# Patient Record
Sex: FEMALE | Race: WHITE | NOT HISPANIC OR LATINO | URBAN - METROPOLITAN AREA
[De-identification: names, ages, dates, MRNs, and addresses within clinical notes are randomized per-mention and may not be internally consistent; named-entity substitution may affect disease eponyms.]

---

## 2017-06-26 ENCOUNTER — APPOINTMENT (OUTPATIENT)
Dept: URBAN - METROPOLITAN AREA CLINIC 200 | Age: 72
Setting detail: DERMATOLOGY
End: 2017-06-28

## 2017-06-26 ENCOUNTER — RX ONLY (RX ONLY)
Age: 72
End: 2017-06-26

## 2017-06-26 DIAGNOSIS — L57.8 OTHER SKIN CHANGES DUE TO CHRONIC EXPOSURE TO NONIONIZING RADIATION: ICD-10-CM

## 2017-06-26 DIAGNOSIS — L30.8 OTHER SPECIFIED DERMATITIS: ICD-10-CM

## 2017-06-26 DIAGNOSIS — D22 MELANOCYTIC NEVI: ICD-10-CM

## 2017-06-26 DIAGNOSIS — D485 NEOPLASM OF UNCERTAIN BEHAVIOR OF SKIN: ICD-10-CM

## 2017-06-26 PROBLEM — D48.5 NEOPLASM OF UNCERTAIN BEHAVIOR OF SKIN: Status: ACTIVE | Noted: 2017-06-26

## 2017-06-26 PROBLEM — D22.5 MELANOCYTIC NEVI OF TRUNK: Status: ACTIVE | Noted: 2017-06-26

## 2017-06-26 PROCEDURE — 99213 OFFICE O/P EST LOW 20 MIN: CPT | Mod: 25

## 2017-06-26 PROCEDURE — OTHER BIOPSY BY SHAVE METHOD: OTHER

## 2017-06-26 PROCEDURE — 11100: CPT

## 2017-06-26 PROCEDURE — OTHER PRESCRIPTION: OTHER

## 2017-06-26 PROCEDURE — OTHER COUNSELING: OTHER

## 2017-06-26 RX ORDER — DOXEPIN HYDROCHLORIDE 50 MG/G
CREAM TOPICAL
Qty: 1 | Refills: 6 | Status: ERX

## 2017-06-26 ASSESSMENT — LOCATION DETAILED DESCRIPTION DERM
LOCATION DETAILED: UPPER STERNUM
LOCATION DETAILED: LEFT CENTRAL BUCCAL CHEEK
LOCATION DETAILED: LEFT ANTERIOR PROXIMAL THIGH
LOCATION DETAILED: LEFT INGUINAL CREASE

## 2017-06-26 ASSESSMENT — LOCATION SIMPLE DESCRIPTION DERM
LOCATION SIMPLE: LEFT THIGH
LOCATION SIMPLE: CHEST
LOCATION SIMPLE: LEFT CHEEK
LOCATION SIMPLE: GROIN

## 2017-06-26 ASSESSMENT — LOCATION ZONE DERM
LOCATION ZONE: TRUNK
LOCATION ZONE: LEG
LOCATION ZONE: FACE

## 2017-06-26 NOTE — PROCEDURE: BIOPSY BY SHAVE METHOD
Dressing: bandage
X Size Of Lesion In Cm: 0
Size Of Lesion In Cm: 0.2
Detail Level: Detailed
Wound Care: Vaseline
Billing Type: Third-Party Bill
Post-Care Instructions: I reviewed with the patient in detail post-care instructions. Patient is to keep the biopsy site dry overnight, and then apply bacitracin twice daily until healed. Patient may apply hydrogen peroxide soaks to remove any crusting.
Notification Instructions: Patient will be notified of biopsy results. However, patient instructed to call the office if not contacted within 2 weeks.
Biopsy Type: H and E
Type Of Destruction Used: Curettage
Bill 66860 For Specimen Handling/Conveyance To Laboratory?: no
Anesthesia Volume In Cc (Will Not Render If 0): 0.1
Hemostasis: Drysol
Anesthesia Type: 1% lidocaine with epinephrine
consent was obtained and risks were reviewed including but not limited to scarring, infection, bleeding, scabbing, incomplete removal, nerve damage and allergy to anesthesia.
Biopsy Method: Personna blade

## 2018-07-05 ENCOUNTER — APPOINTMENT (OUTPATIENT)
Dept: URBAN - METROPOLITAN AREA CLINIC 200 | Age: 73
Setting detail: DERMATOLOGY
End: 2018-07-05

## 2018-07-05 DIAGNOSIS — L57.0 ACTINIC KERATOSIS: ICD-10-CM

## 2018-07-05 DIAGNOSIS — L57.8 OTHER SKIN CHANGES DUE TO CHRONIC EXPOSURE TO NONIONIZING RADIATION: ICD-10-CM

## 2018-07-05 DIAGNOSIS — Z85.828 PERSONAL HISTORY OF OTHER MALIGNANT NEOPLASM OF SKIN: ICD-10-CM

## 2018-07-05 PROCEDURE — 17000 DESTRUCT PREMALG LESION: CPT

## 2018-07-05 PROCEDURE — 99213 OFFICE O/P EST LOW 20 MIN: CPT | Mod: 25

## 2018-07-05 PROCEDURE — OTHER LIQUID NITROGEN: OTHER

## 2018-07-05 PROCEDURE — OTHER COUNSELING: OTHER

## 2018-07-05 ASSESSMENT — LOCATION SIMPLE DESCRIPTION DERM
LOCATION SIMPLE: CHEST
LOCATION SIMPLE: LEFT HAND
LOCATION SIMPLE: LEFT ANTERIOR NECK

## 2018-07-05 ASSESSMENT — LOCATION ZONE DERM
LOCATION ZONE: NECK
LOCATION ZONE: HAND
LOCATION ZONE: TRUNK

## 2018-07-05 ASSESSMENT — LOCATION DETAILED DESCRIPTION DERM
LOCATION DETAILED: LEFT MEDIAL SUPERIOR CHEST
LOCATION DETAILED: LEFT INFERIOR ANTERIOR NECK
LOCATION DETAILED: LEFT ULNAR DORSAL HAND

## 2018-07-05 ASSESSMENT — PAIN INTENSITY VAS: HOW INTENSE IS YOUR PAIN 0 BEING NO PAIN, 10 BEING THE MOST SEVERE PAIN POSSIBLE?: NO PAIN

## 2019-08-09 ENCOUNTER — APPOINTMENT (OUTPATIENT)
Dept: URBAN - METROPOLITAN AREA CLINIC 200 | Age: 74
Setting detail: DERMATOLOGY
End: 2019-08-22

## 2019-08-09 DIAGNOSIS — L57.8 OTHER SKIN CHANGES DUE TO CHRONIC EXPOSURE TO NONIONIZING RADIATION: ICD-10-CM

## 2019-08-09 DIAGNOSIS — Z85.828 PERSONAL HISTORY OF OTHER MALIGNANT NEOPLASM OF SKIN: ICD-10-CM

## 2019-08-09 DIAGNOSIS — L72.0 EPIDERMAL CYST: ICD-10-CM

## 2019-08-09 PROCEDURE — OTHER REASSURANCE: OTHER

## 2019-08-09 PROCEDURE — 99213 OFFICE O/P EST LOW 20 MIN: CPT

## 2019-08-09 PROCEDURE — OTHER COUNSELING: OTHER

## 2019-08-09 ASSESSMENT — LOCATION DETAILED DESCRIPTION DERM
LOCATION DETAILED: LEFT INFERIOR NASAL CHEEK
LOCATION DETAILED: LEFT MEDIAL SUPERIOR CHEST
LOCATION DETAILED: LEFT INFERIOR ANTERIOR NECK

## 2019-08-09 ASSESSMENT — LOCATION ZONE DERM
LOCATION ZONE: NECK
LOCATION ZONE: TRUNK
LOCATION ZONE: FACE

## 2019-08-09 ASSESSMENT — LOCATION SIMPLE DESCRIPTION DERM
LOCATION SIMPLE: LEFT CHEEK
LOCATION SIMPLE: CHEST
LOCATION SIMPLE: LEFT ANTERIOR NECK

## 2020-06-29 RX ORDER — CYANOCOBALAMIN (VITAMIN B-12) 500 MCG
400 TABLET ORAL EVERY MORNING
COMMUNITY

## 2020-06-29 RX ORDER — SIMVASTATIN 20 MG/1
20 TABLET, FILM COATED ORAL NIGHTLY
COMMUNITY

## 2020-06-29 RX ORDER — HYDROCHLOROTHIAZIDE 25 MG/1
25 TABLET ORAL EVERY MORNING
COMMUNITY

## 2020-06-29 RX ORDER — DILTIAZEM HYDROCHLORIDE 240 MG/1
240 CAPSULE, COATED, EXTENDED RELEASE ORAL EVERY MORNING
COMMUNITY

## 2020-06-29 RX ORDER — LEVOMEFOLATE CALCIUM 15 MG
15 TABLET ORAL NIGHTLY
COMMUNITY

## 2020-06-29 RX ORDER — VENLAFAXINE HYDROCHLORIDE 225 MG/1
TABLET, EXTENDED RELEASE ORAL EVERY MORNING
COMMUNITY

## 2020-06-29 RX ORDER — LOSARTAN POTASSIUM 100 MG/1
100 TABLET ORAL EVERY MORNING
COMMUNITY

## 2020-06-29 SDOH — HEALTH STABILITY: MENTAL HEALTH: HOW OFTEN DO YOU HAVE A DRINK CONTAINING ALCOHOL?: 4 OR MORE TIMES A WEEK

## 2020-06-29 SDOH — HEALTH STABILITY: MENTAL HEALTH: HOW OFTEN DO YOU HAVE SIX OR MORE DRINKS ON ONE OCCASION?: WEEKLY

## 2020-06-29 SDOH — HEALTH STABILITY: MENTAL HEALTH: HOW MANY DRINKS CONTAINING ALCOHOL DO YOU HAVE ON A TYPICAL DAY WHEN YOU ARE DRINKING?: 1 OR 2

## 2020-06-29 NOTE — PRE-PROCEDURE INSTRUCTIONS
1. Admissions will call you with your arrival time on 7/8/2020 (day prior to surgery) between 2pm - 4pm. For questions about your arrival time, please call [phone].    2. Please report to the [facility] in the [place] on the day of your procedure. Enter the hospital through the [place] [facility] (main entrance at [address], [place]). If you are parking in the [facility], come to the ground floor of the garage and follow signs to the [facility]. Bring your insurance card and photo ID.     3. Please follow these fasting guidelines:  - STOP all solid food 8 hours prior to arrival.   - No more than 12oz of water is permitted and must STOP 2 HOURS prior to arrival to the hospital.     4. Early on the morning of the procedure, please take the following medications listed below with a sip of water, in addition to any medications prescribed by your surgeon: Diltiazem, Venlafaxine    *NO aspirin, ibuprofen, anti-inflammatories, fish oil or Vitamin E unless ordered by physician.    *Stop taking      5. Other instructions: Hibiclens shower x 2(no face or genital area), brush teeth    6. If you develop a cough, cold, fever, or other symptom prior to the date of the procedure, please report it to your physician immediately.    7. If you need to cancel the procedure for any reason, please contact your physician.    8. Make arrangements to have someone drive you home from the procedure. If you have not arranged for transportation home, your surgery may be cancelled.     9. You may not take public transportation or or a cab unless accompanied by a responsible person.    10. You may not drive a car or operate complex or potentially dangerous machinery for 24 hours following anesthesia and/or sedation.    11. If it is medically necessary for you to have a longer stay, you will be informed as soon as the decision is made.    12. Do not wear or bring anything of value to the hospital,  including medication or jewelry of any kind. Do not wear make-up or contact lenses. Do bring your glasses and hearing aid, with a case. If you use a CPAP machine and will be overnight, please bring it with you. If you use any inhalers, please bring them as well.     13. Dress in comfortable clothes.    14. If instructed, please bring a copy of your Advance Directive (Living Will/Durable Power of ) on the day of your procedure.    Pre operative instructions given as per protocol.  Form explained by:

## 2020-07-01 ENCOUNTER — TRANSCRIBE ORDERS (OUTPATIENT)
Dept: LAB | Facility: HOSPITAL | Age: 75
End: 2020-07-01

## 2020-07-01 ENCOUNTER — HOSPITAL ENCOUNTER (OUTPATIENT)
Dept: CARDIOLOGY | Facility: HOSPITAL | Age: 75
Discharge: HOME | End: 2020-07-01
Attending: ORTHOPAEDIC SURGERY
Payer: MEDICARE

## 2020-07-01 ENCOUNTER — APPOINTMENT (OUTPATIENT)
Dept: PREADMISSION TESTING | Facility: HOSPITAL | Age: 75
End: 2020-07-01
Attending: ORTHOPAEDIC SURGERY
Payer: MEDICARE

## 2020-07-01 ENCOUNTER — APPOINTMENT (OUTPATIENT)
Dept: LAB | Facility: HOSPITAL | Age: 75
End: 2020-07-01
Attending: ORTHOPAEDIC SURGERY
Payer: MEDICARE

## 2020-07-01 VITALS
RESPIRATION RATE: 18 BRPM | SYSTOLIC BLOOD PRESSURE: 172 MMHG | TEMPERATURE: 97.3 F | HEART RATE: 96 BPM | WEIGHT: 185.8 LBS | OXYGEN SATURATION: 97 % | DIASTOLIC BLOOD PRESSURE: 79 MMHG | HEIGHT: 65 IN | BODY MASS INDEX: 30.96 KG/M2

## 2020-07-01 DIAGNOSIS — M17.12 UNILATERAL PRIMARY OSTEOARTHRITIS, LEFT KNEE: ICD-10-CM

## 2020-07-01 DIAGNOSIS — M17.12 UNILATERAL PRIMARY OSTEOARTHRITIS, LEFT KNEE: Primary | ICD-10-CM

## 2020-07-01 DIAGNOSIS — E78.49 OTHER HYPERLIPIDEMIA: ICD-10-CM

## 2020-07-01 DIAGNOSIS — Z01.818 PREOP EXAMINATION: Primary | ICD-10-CM

## 2020-07-01 DIAGNOSIS — I10 ESSENTIAL HYPERTENSION: ICD-10-CM

## 2020-07-01 PROBLEM — E78.5 HYPERLIPIDEMIA: Status: ACTIVE | Noted: 2020-07-01

## 2020-07-01 LAB
ANION GAP SERPL CALC-SCNC: 11 MEQ/L (ref 3–15)
BUN SERPL-MCNC: 13 MG/DL (ref 8–20)
CALCIUM SERPL-MCNC: 8.9 MG/DL (ref 8.9–10.3)
CHLORIDE SERPL-SCNC: 99 MEQ/L (ref 98–109)
CO2 SERPL-SCNC: 26 MEQ/L (ref 22–32)
CREAT SERPL-MCNC: 0.6 MG/DL (ref 0.6–1.1)
ERYTHROCYTE [DISTWIDTH] IN BLOOD BY AUTOMATED COUNT: 13.2 % (ref 11.7–14.4)
GFR SERPL CREATININE-BSD FRML MDRD: >60 ML/MIN/1.73M*2
GLUCOSE SERPL-MCNC: 94 MG/DL (ref 70–99)
HCT VFR BLDCO AUTO: 41.4 % (ref 35–45)
HGB BLD-MCNC: 14 G/DL (ref 11.8–15.7)
MCH RBC QN AUTO: 31 PG (ref 28–33.2)
MCHC RBC AUTO-ENTMCNC: 33.8 G/DL (ref 32.2–35.5)
MCV RBC AUTO: 91.6 FL (ref 83–98)
PDW BLD AUTO: 9.3 FL (ref 9.4–12.3)
PLATELET # BLD AUTO: 247 K/UL (ref 150–369)
POTASSIUM SERPL-SCNC: 3.8 MEQ/L (ref 3.6–5.1)
RBC # BLD AUTO: 4.52 M/UL (ref 3.93–5.22)
SODIUM SERPL-SCNC: 136 MEQ/L (ref 136–144)
WBC # BLD AUTO: 9.15 K/UL (ref 3.8–10.5)

## 2020-07-01 PROCEDURE — 80048 BASIC METABOLIC PNL TOTAL CA: CPT

## 2020-07-01 PROCEDURE — 85027 COMPLETE CBC AUTOMATED: CPT | Mod: GZ

## 2020-07-01 PROCEDURE — 93005 ELECTROCARDIOGRAM TRACING: CPT

## 2020-07-01 PROCEDURE — 99215 OFFICE O/P EST HI 40 MIN: CPT | Performed by: HOSPITALIST

## 2020-07-01 PROCEDURE — 36415 COLL VENOUS BLD VENIPUNCTURE: CPT | Mod: GA

## 2020-07-01 ASSESSMENT — PAIN SCALES - GENERAL: PAINLEVEL: 0-NO PAIN

## 2020-07-01 NOTE — CONSULTS
[facility] Medicine Service -  Pre-Operative Consultation       Patient Name: Anne-Marie Quiroga  Referring Surgeon: dr. keenan    Reason for Referral: Pre-Operative Evaluation  Surgical Procedure: Left Total Knee Replacement  Operative Date: 7/9/20  Other Providers:      PCP: Yariel Velasco MD        HISTORY OF PRESENT ILLNESS      Anne-Marie Quiroga is a 75 y.o. female presenting today to the [facility] at [facility] for pre-operative evaluation prior to planned surgery.  Pt has had knee pain chronically. Pt has tried conservative management such as injection therapy without success. Pt has now opted for surgical intervention.     The patient denies any current or recent chest pain or pressure, dyspnea, cough, sputum, fevers, chills, abdominal pain, nausea, vomiting, diarrhea, urinary complaints, dizzy, lightheaded, blood in stool or other symptoms.     Functionally, the patient is able to ascend a flight or so of stairs with no dyspnea or chest pain.     The patient denies, on specific questioning, the following:  No history of MI, arrhythmia,or CHF.  No history of FERNANDO.  No history of DVT/PE.  No history of COPD.  No history of CVA.  No history of DM.   No history of CKD.     PAST MEDICAL AND SURGICAL HISTORY      Past Medical History:   Diagnosis Date   • Anxiety    • Arthritis    • Basal cell carcinoma     left eye/left nasal   • Depression    • History of nuclear stress test 03/06/2020    sent by PCP prior to L TKR   • Hypertension    • Lipid disorder        Past Surgical History:   Procedure Laterality Date   • BASAL CELL CARCINOMA EXCISION      left eye area/left nasal   • CHOLECYSTECTOMY     • COLONOSCOPY W/ POLYPECTOMY     • HYSTERECTOMY         MEDICATIONS        Current Outpatient Medications:   •  cholecalciferol, vitamin D3, (cholecalciferol) 400 unit (10 mcg) tablet tablet, Take 400 Units by mouth every morning., Disp: , Rfl:   •  dilTIAZem CD (CARDIZEM CD) 240 mg 24  "hr capsule, Take 240 mg by mouth every morning., Disp: , Rfl:   •  hydrochlorothiazide (HYDRODIURIL) 25 mg tablet, Take 25 mg by mouth every morning., Disp: , Rfl:   •  levomefolate calcium (L-METHYLFOLATE) 15 mg tablet, Take 15 mg by mouth nightly., Disp: , Rfl:   •  losartan (COZAAR) 100 mg tablet, Take 100 mg by mouth every morning., Disp: , Rfl:   •  multivit-min/iron/folic/lutein (CENTRUM SILVER WOMEN ORAL), Take 1 tablet by mouth every morning., Disp: , Rfl:   •  simvastatin (ZOCOR) 20 mg tablet, Take 20 mg by mouth nightly., Disp: , Rfl:   •  venlafaxine 225 mg tablet extended release 24hr 24 hr tablet, Take by mouth every morning., Disp: , Rfl:     ALLERGIES      Sulfa (sulfonamide antibiotics)    FAMILY HISTORY      family history is not on file.    Denies any prior known family history of DVTs/PEs/clotting disorder    SOCIAL HISTORY      Social History     Tobacco Use   • Smoking status: Former Smoker     Packs/day: 1.00     Years: 40.00     Pack years: 40.00     Types: Cigarettes     Last attempt to quit: 2017     Years since quitting: 3.4   • Smokeless tobacco: Never Used   Substance Use Topics   • Alcohol use: Yes     Alcohol/week: 14.0 standard drinks     Types: 14 Glasses of wine per week     Frequency: 4 or more times a week     Drinks per session: 1 or 2     Binge frequency: Weekly   • Drug use: Never       REVIEW OF SYSTEMS      All other systems reviewed and negative except as noted in HPI    PHYSICAL EXAMINATION      Visit Vitals  BP (!) 172/79 (BP Location: Left upper arm, Patient Position: Sitting)   Pulse 96   Temp 36.3 °C (97.3 °F) (Temporal)   Resp 18   Ht 1.651 m (5' 5\")   Wt 84.3 kg (185 lb 12.8 oz)   SpO2 97% Comment: RA   Breastfeeding No   BMI 30.92 kg/m²     Body mass index is 30.92 kg/m².    Physical Exam   Constitutional: She is oriented to person, place, and time.   Eyes: EOM are normal.   Cardiovascular: Regular rhythm. Exam reveals no gallop and no friction rub.   Pulmonary/Chest: " Effort normal and breath sounds normal. No stridor. No respiratory distress. She has no wheezes. She has no rales.   Abdominal: Soft. Bowel sounds are normal.   Neurological: She is alert and oriented to person, place, and time.   Skin: Skin is warm. She is not diaphoretic.   Psychiatric: She has a normal mood and affect. Her behavior is normal. Judgment and thought content normal.       LABS / EKG        Labs  Results from last 7 days   Lab Units 07/01/20  1155   WBC K/uL 9.15   HEMOGLOBIN g/dL 14.0   HEMATOCRIT % 41.4   PLATELETS K/uL 247     Results from last 7 days   Lab Units 07/01/20  1155   SODIUM mEQ/L 136   POTASSIUM mEQ/L 3.8   CHLORIDE mEQ/L 99   CO2 mEQ/L 26   BUN mg/dL 13   CREATININE mg/dL 0.6   CALCIUM mg/dL 8.9   GLUCOSE mg/dL 94         EKG   EKG: independently reviewed- sr w hr of 85. No st elevations.     ASSESSMENT AND PLAN         Preop examination  Pt sched for Left Total Knee Replacement  Pt has no cardiac symptoms and ekg is nonischemic. Pt exhibits no signs/symptoms of angina, arrythmia or decompenstated CHF. Pt also had stress test done in 3/6/20 which was unremarkable per dr. Velasco  Pt's labs are at an acceptable level to proceed w surgery  Pt is an intermediate but acceptable risk for surgery.     HTN (hypertension)  Cont w ccb  Hold hctz/cozaar on the morning of surgery    Hyperlipidemia  Cont w statin.        In regards to perioperative cardiac risk:  The patient denies any history of ischemic heart disease, denies any history of CHF, denies any history of CVA, is not on pre-operative treatment with insulin, and does not have a pre-operative creatinine > 2 mg/dL.   Pt's revised cardiac risk index  (RCRI) is 0.4 % for rate of cardiac death, nonfatal myocardial infarction, and nonfatal cardiac arrest     Further comments:  Resume supplements when OK with surgical team.  I would encourage incentive spirometry to assist with minimizing emmy-operative pulmonary risk.  DVT prophylaxis and  timing of such per the discretion of the surgeon.     Please do not hesitate to contact [facility] during the upcoming hospitalization with any questions or concerns.     Niraj Taveras. DO [place]  7/1/2020

## 2020-07-01 NOTE — H&P (VIEW-ONLY)
[facility] Medicine Service -  Pre-Operative Consultation       Patient Name: Anne-Marie Quiroga  Referring Surgeon: dr. keenan    Reason for Referral: Pre-Operative Evaluation  Surgical Procedure: Left Total Knee Replacement  Operative Date: 7/9/20  Other Providers:      PCP: Yariel Velasco MD        HISTORY OF PRESENT ILLNESS      Anne-Marie Quiroga is a 75 y.o. female presenting today to the [facility] at [facility] for pre-operative evaluation prior to planned surgery.  Pt has had knee pain chronically. Pt has tried conservative management such as injection therapy without success. Pt has now opted for surgical intervention.     The patient denies any current or recent chest pain or pressure, dyspnea, cough, sputum, fevers, chills, abdominal pain, nausea, vomiting, diarrhea, urinary complaints, dizzy, lightheaded, blood in stool or other symptoms.     Functionally, the patient is able to ascend a flight or so of stairs with no dyspnea or chest pain.     The patient denies, on specific questioning, the following:  No history of MI, arrhythmia,or CHF.  No history of FERNANDO.  No history of DVT/PE.  No history of COPD.  No history of CVA.  No history of DM.   No history of CKD.     PAST MEDICAL AND SURGICAL HISTORY      Past Medical History:   Diagnosis Date   • Anxiety    • Arthritis    • Basal cell carcinoma     left eye/left nasal   • Depression    • History of nuclear stress test 03/06/2020    sent by PCP prior to L TKR   • Hypertension    • Lipid disorder        Past Surgical History:   Procedure Laterality Date   • BASAL CELL CARCINOMA EXCISION      left eye area/left nasal   • CHOLECYSTECTOMY     • COLONOSCOPY W/ POLYPECTOMY     • HYSTERECTOMY         MEDICATIONS        Current Outpatient Medications:   •  cholecalciferol, vitamin D3, (cholecalciferol) 400 unit (10 mcg) tablet tablet, Take 400 Units by mouth every morning., Disp: , Rfl:   •  dilTIAZem CD (CARDIZEM CD) 240 mg 24  "hr capsule, Take 240 mg by mouth every morning., Disp: , Rfl:   •  hydrochlorothiazide (HYDRODIURIL) 25 mg tablet, Take 25 mg by mouth every morning., Disp: , Rfl:   •  levomefolate calcium (L-METHYLFOLATE) 15 mg tablet, Take 15 mg by mouth nightly., Disp: , Rfl:   •  losartan (COZAAR) 100 mg tablet, Take 100 mg by mouth every morning., Disp: , Rfl:   •  multivit-min/iron/folic/lutein (CENTRUM SILVER WOMEN ORAL), Take 1 tablet by mouth every morning., Disp: , Rfl:   •  simvastatin (ZOCOR) 20 mg tablet, Take 20 mg by mouth nightly., Disp: , Rfl:   •  venlafaxine 225 mg tablet extended release 24hr 24 hr tablet, Take by mouth every morning., Disp: , Rfl:     ALLERGIES      Sulfa (sulfonamide antibiotics)    FAMILY HISTORY      family history is not on file.    Denies any prior known family history of DVTs/PEs/clotting disorder    SOCIAL HISTORY      Social History     Tobacco Use   • Smoking status: Former Smoker     Packs/day: 1.00     Years: 40.00     Pack years: 40.00     Types: Cigarettes     Last attempt to quit: 2017     Years since quitting: 3.4   • Smokeless tobacco: Never Used   Substance Use Topics   • Alcohol use: Yes     Alcohol/week: 14.0 standard drinks     Types: 14 Glasses of wine per week     Frequency: 4 or more times a week     Drinks per session: 1 or 2     Binge frequency: Weekly   • Drug use: Never       REVIEW OF SYSTEMS      All other systems reviewed and negative except as noted in HPI    PHYSICAL EXAMINATION      Visit Vitals  BP (!) 172/79 (BP Location: Left upper arm, Patient Position: Sitting)   Pulse 96   Temp 36.3 °C (97.3 °F) (Temporal)   Resp 18   Ht 1.651 m (5' 5\")   Wt 84.3 kg (185 lb 12.8 oz)   SpO2 97% Comment: RA   Breastfeeding No   BMI 30.92 kg/m²     Body mass index is 30.92 kg/m².    Physical Exam   Constitutional: She is oriented to person, place, and time.   Eyes: EOM are normal.   Cardiovascular: Regular rhythm. Exam reveals no gallop and no friction rub.   Pulmonary/Chest: " Effort normal and breath sounds normal. No stridor. No respiratory distress. She has no wheezes. She has no rales.   Abdominal: Soft. Bowel sounds are normal.   Neurological: She is alert and oriented to person, place, and time.   Skin: Skin is warm. She is not diaphoretic.   Psychiatric: She has a normal mood and affect. Her behavior is normal. Judgment and thought content normal.       LABS / EKG        Labs  Results from last 7 days   Lab Units 07/01/20  1155   WBC K/uL 9.15   HEMOGLOBIN g/dL 14.0   HEMATOCRIT % 41.4   PLATELETS K/uL 247     Results from last 7 days   Lab Units 07/01/20  1155   SODIUM mEQ/L 136   POTASSIUM mEQ/L 3.8   CHLORIDE mEQ/L 99   CO2 mEQ/L 26   BUN mg/dL 13   CREATININE mg/dL 0.6   CALCIUM mg/dL 8.9   GLUCOSE mg/dL 94         EKG   EKG: independently reviewed- sr w hr of 85. No st elevations.     ASSESSMENT AND PLAN         Preop examination  Pt sched for Left Total Knee Replacement  Pt has no cardiac symptoms and ekg is nonischemic. Pt exhibits no signs/symptoms of angina, arrythmia or decompenstated CHF. Pt also had stress test done in 3/6/20 which was unremarkable per dr. Velasco  Pt's labs are at an acceptable level to proceed w surgery  Pt is an intermediate but acceptable risk for surgery.     HTN (hypertension)  Cont w ccb  Hold hctz/cozaar on the morning of surgery    Hyperlipidemia  Cont w statin.        In regards to perioperative cardiac risk:  The patient denies any history of ischemic heart disease, denies any history of CHF, denies any history of CVA, is not on pre-operative treatment with insulin, and does not have a pre-operative creatinine > 2 mg/dL.   Pt's revised cardiac risk index  (RCRI) is 0.4 % for rate of cardiac death, nonfatal myocardial infarction, and nonfatal cardiac arrest     Further comments:  Resume supplements when OK with surgical team.  I would encourage incentive spirometry to assist with minimizing emmy-operative pulmonary risk.  DVT prophylaxis and  timing of such per the discretion of the surgeon.     Please do not hesitate to contact [facility] during the upcoming hospitalization with any questions or concerns.     Niraj Taveras. DO [place]  7/1/2020

## 2020-07-01 NOTE — ASSESSMENT & PLAN NOTE
Pt sched for Left Total Knee Replacement  Pt has no cardiac symptoms and ekg is nonischemic. Pt exhibits no signs/symptoms of angina, arrythmia or decompenstated CHF. Pt also had stress test done in 3/6/20 which was unremarkable per dr. Velasco  Pt's labs are at an acceptable level to proceed w surgery  Pt is an intermediate but acceptable risk for surgery.

## 2020-07-02 LAB
ATRIAL RATE: 85
P AXIS: 72
PR INTERVAL: 172
QRS DURATION: 80
QT INTERVAL: 376
QTC CALCULATION(BAZETT): 447
R AXIS: 72
T WAVE AXIS: 76
VENTRICULAR RATE: 85

## 2020-07-06 ENCOUNTER — CLINICAL SUPPORT (OUTPATIENT)
Dept: PREADMISSION TESTING | Facility: HOSPITAL | Age: 75
DRG: 470 | End: 2020-07-06
Attending: ORTHOPAEDIC SURGERY
Payer: MEDICARE

## 2020-07-06 ENCOUNTER — TRANSCRIBE ORDERS (OUTPATIENT)
Dept: LAB | Facility: HOSPITAL | Age: 75
End: 2020-07-06

## 2020-07-06 DIAGNOSIS — Z01.818 ENCOUNTER FOR OTHER PREPROCEDURAL EXAMINATION: ICD-10-CM

## 2020-07-06 DIAGNOSIS — Z11.59 ENCOUNTER FOR SCREENING FOR OTHER VIRAL DISEASES: ICD-10-CM

## 2020-07-06 DIAGNOSIS — M17.12 UNILATERAL PRIMARY OSTEOARTHRITIS, LEFT KNEE: ICD-10-CM

## 2020-07-06 DIAGNOSIS — M17.12 UNILATERAL PRIMARY OSTEOARTHRITIS, LEFT KNEE: Primary | ICD-10-CM

## 2020-07-06 PROCEDURE — U0002 COVID-19 LAB TEST NON-CDC: HCPCS

## 2020-07-07 LAB — SARS-COV-2 RNA RESP QL NAA+PROBE: NOT DETECTED

## 2020-07-07 NOTE — RESULT ENCOUNTER NOTE
Patient's COVID-19 test result is negative. Per protocol, routed results to ordering provider/surgeon listed for upcoming procedure.

## 2020-07-09 ENCOUNTER — HOSPITAL ENCOUNTER (INPATIENT)
Facility: HOSPITAL | Age: 75
LOS: 1 days | Discharge: HOME | DRG: 470 | End: 2020-07-10
Attending: ORTHOPAEDIC SURGERY | Admitting: ORTHOPAEDIC SURGERY
Payer: MEDICARE

## 2020-07-09 ENCOUNTER — APPOINTMENT (OUTPATIENT)
Dept: RADIOLOGY | Facility: HOSPITAL | Age: 75
Setting detail: SURGERY ADMIT
DRG: 470 | End: 2020-07-09
Attending: NURSE PRACTITIONER
Payer: MEDICARE

## 2020-07-09 ENCOUNTER — ANESTHESIA EVENT (OUTPATIENT)
Dept: OPERATING ROOM | Facility: HOSPITAL | Age: 75
Setting detail: SURGERY ADMIT
DRG: 470 | End: 2020-07-09
Payer: MEDICARE

## 2020-07-09 PROBLEM — M15.9 OSTEOARTHRITIS OF MULTIPLE JOINTS: Status: ACTIVE | Noted: 2020-07-09

## 2020-07-09 PROBLEM — F32.9 MAJOR DEPRESSIVE DISORDER: Status: ACTIVE | Noted: 2020-07-09

## 2020-07-09 PROBLEM — Z96.652 STATUS POST TOTAL LEFT KNEE REPLACEMENT: Status: ACTIVE | Noted: 2020-07-09

## 2020-07-09 PROBLEM — F41.9 ANXIETY: Status: ACTIVE | Noted: 2020-07-09

## 2020-07-09 PROCEDURE — 36000016 HC OR LEVEL 6 EA ADDL MIN: Performed by: ORTHOPAEDIC SURGERY

## 2020-07-09 PROCEDURE — 12000000 HC ROOM AND CARE MED/SURG

## 2020-07-09 PROCEDURE — 0SRD0J9 REPLACEMENT OF LEFT KNEE JOINT WITH SYNTHETIC SUBSTITUTE, CEMENTED, OPEN APPROACH: ICD-10-PCS | Performed by: ORTHOPAEDIC SURGERY

## 2020-07-09 PROCEDURE — C1776 JOINT DEVICE (IMPLANTABLE): HCPCS | Performed by: ORTHOPAEDIC SURGERY

## 2020-07-09 PROCEDURE — 63600000 HC DRUGS/DETAIL CODE: Performed by: NURSE PRACTITIONER

## 2020-07-09 PROCEDURE — 71000001 HC PACU PHASE 1 INITIAL 30MIN: Performed by: ORTHOPAEDIC SURGERY

## 2020-07-09 PROCEDURE — 97165 OT EVAL LOW COMPLEX 30 MIN: CPT | Mod: GO

## 2020-07-09 PROCEDURE — 63600000 HC DRUGS/DETAIL CODE: Performed by: ANESTHESIOLOGY

## 2020-07-09 PROCEDURE — 63700000 HC SELF-ADMINISTRABLE DRUG

## 2020-07-09 PROCEDURE — 73560 X-RAY EXAM OF KNEE 1 OR 2: CPT | Mod: LT

## 2020-07-09 PROCEDURE — C1713 ANCHOR/SCREW BN/BN,TIS/BN: HCPCS | Performed by: ORTHOPAEDIC SURGERY

## 2020-07-09 PROCEDURE — 97161 PT EVAL LOW COMPLEX 20 MIN: CPT | Mod: GP

## 2020-07-09 PROCEDURE — 25000000 HC PHARMACY GENERAL: Performed by: ANESTHESIOLOGY

## 2020-07-09 PROCEDURE — 37000010 HC ANESTHESIA SPINAL: Performed by: ORTHOPAEDIC SURGERY

## 2020-07-09 PROCEDURE — 25800000 HC PHARMACY IV SOLUTIONS: Performed by: NURSE PRACTITIONER

## 2020-07-09 PROCEDURE — 36000006 HC OR LEVEL 6 INITIAL 30MIN: Performed by: ORTHOPAEDIC SURGERY

## 2020-07-09 PROCEDURE — 99233 SBSQ HOSP IP/OBS HIGH 50: CPT | Performed by: HOSPITALIST

## 2020-07-09 PROCEDURE — 63600000 HC DRUGS/DETAIL CODE: Performed by: ORTHOPAEDIC SURGERY

## 2020-07-09 PROCEDURE — 25000000 HC PHARMACY GENERAL: Performed by: ORTHOPAEDIC SURGERY

## 2020-07-09 PROCEDURE — 71000011 HC PACU PHASE 1 EA ADDL MIN: Performed by: ORTHOPAEDIC SURGERY

## 2020-07-09 PROCEDURE — 25800000 HC PHARMACY IV SOLUTIONS: Performed by: ORTHOPAEDIC SURGERY

## 2020-07-09 PROCEDURE — 27200000 HC STERILE SUPPLY: Performed by: ORTHOPAEDIC SURGERY

## 2020-07-09 PROCEDURE — 63700000 HC SELF-ADMINISTRABLE DRUG: Performed by: NURSE PRACTITIONER

## 2020-07-09 DEVICE — CEMENT BONE SIMPLEX FULL DOSE: Type: IMPLANTABLE DEVICE | Site: KNEE | Status: FUNCTIONAL

## 2020-07-09 DEVICE — PERSONA KNEE TAPERED CEMENTED 14 X +30MM: Type: IMPLANTABLE DEVICE | Site: TIBIA | Status: FUNCTIONAL

## 2020-07-09 DEVICE — COMPONENT TIBIAL STEM PERSONA: Type: IMPLANTABLE DEVICE | Site: TIBIA | Status: FUNCTIONAL

## 2020-07-09 DEVICE — IMPLANTABLE DEVICE: Type: IMPLANTABLE DEVICE | Site: FEMUR | Status: FUNCTIONAL

## 2020-07-09 DEVICE — IMPLANTABLE DEVICE: Type: IMPLANTABLE DEVICE | Site: TIBIA | Status: FUNCTIONAL

## 2020-07-09 DEVICE — COMPONENT PATELLAR                                      ALL: Type: IMPLANTABLE DEVICE | Site: PATELLA | Status: FUNCTIONAL

## 2020-07-09 RX ORDER — ONDANSETRON HYDROCHLORIDE 2 MG/ML
4 INJECTION, SOLUTION INTRAVENOUS EVERY 8 HOURS PRN
Status: DISCONTINUED | OUTPATIENT
Start: 2020-07-09 | End: 2020-07-10 | Stop reason: HOSPADM

## 2020-07-09 RX ORDER — TRAMADOL HYDROCHLORIDE 50 MG/1
50 TABLET ORAL EVERY 8 HOURS PRN
Status: DISCONTINUED | OUTPATIENT
Start: 2020-07-09 | End: 2020-07-10 | Stop reason: HOSPADM

## 2020-07-09 RX ORDER — NAPROXEN 250 MG/1
TABLET ORAL
Status: COMPLETED
Start: 2020-07-09 | End: 2020-07-09

## 2020-07-09 RX ORDER — CEFAZOLIN SODIUM 2 G/50ML
2 SOLUTION INTRAVENOUS
Status: COMPLETED | OUTPATIENT
Start: 2020-07-09 | End: 2020-07-09

## 2020-07-09 RX ORDER — BISACODYL 10 MG/1
10 SUPPOSITORY RECTAL DAILY PRN
Status: DISCONTINUED | OUTPATIENT
Start: 2020-07-09 | End: 2020-07-10 | Stop reason: HOSPADM

## 2020-07-09 RX ORDER — SODIUM CHLORIDE 9 MG/ML
INJECTION, SOLUTION INTRAVENOUS CONTINUOUS
Status: DISPENSED | OUTPATIENT
Start: 2020-07-09 | End: 2020-07-10

## 2020-07-09 RX ORDER — POLYETHYLENE GLYCOL 3350 17 G/17G
17 POWDER, FOR SOLUTION ORAL DAILY
Status: DISCONTINUED | OUTPATIENT
Start: 2020-07-09 | End: 2020-07-10 | Stop reason: HOSPADM

## 2020-07-09 RX ORDER — ONDANSETRON HYDROCHLORIDE 2 MG/ML
4 INJECTION, SOLUTION INTRAVENOUS
Status: DISCONTINUED | OUTPATIENT
Start: 2020-07-09 | End: 2020-07-09

## 2020-07-09 RX ORDER — CEFAZOLIN SODIUM 2 G/50ML
SOLUTION INTRAVENOUS
Status: DISPENSED
Start: 2020-07-09 | End: 2020-07-09

## 2020-07-09 RX ORDER — IBUPROFEN 200 MG
16-32 TABLET ORAL AS NEEDED
Status: DISCONTINUED | OUTPATIENT
Start: 2020-07-09 | End: 2020-07-09

## 2020-07-09 RX ORDER — PANTOPRAZOLE SODIUM 40 MG/1
40 TABLET, DELAYED RELEASE ORAL DAILY
Status: DISCONTINUED | OUTPATIENT
Start: 2020-07-09 | End: 2020-07-10 | Stop reason: HOSPADM

## 2020-07-09 RX ORDER — TRANEXAMIC ACID 10 MG/ML
1000 INJECTION, SOLUTION INTRAVENOUS ONCE
Status: COMPLETED | OUTPATIENT
Start: 2020-07-09 | End: 2020-07-09

## 2020-07-09 RX ORDER — IBUPROFEN 200 MG
16-32 TABLET ORAL AS NEEDED
Status: DISCONTINUED | OUTPATIENT
Start: 2020-07-09 | End: 2020-07-10 | Stop reason: HOSPADM

## 2020-07-09 RX ORDER — BUPIVACAINE HYDROCHLORIDE 7.5 MG/ML
INJECTION, SOLUTION INTRASPINAL
Status: COMPLETED | OUTPATIENT
Start: 2020-07-09 | End: 2020-07-09

## 2020-07-09 RX ORDER — GABAPENTIN 300 MG/1
300 CAPSULE ORAL
Status: COMPLETED | OUTPATIENT
Start: 2020-07-09 | End: 2020-07-09

## 2020-07-09 RX ORDER — DEXTROSE 50 % IN WATER (D50W) INTRAVENOUS SYRINGE
25 AS NEEDED
Status: DISCONTINUED | OUTPATIENT
Start: 2020-07-09 | End: 2020-07-09

## 2020-07-09 RX ORDER — DEXTROSE 40 %
15-30 GEL (GRAM) ORAL AS NEEDED
Status: DISCONTINUED | OUTPATIENT
Start: 2020-07-09 | End: 2020-07-09

## 2020-07-09 RX ORDER — DILTIAZEM HYDROCHLORIDE 240 MG/1
240 CAPSULE, COATED, EXTENDED RELEASE ORAL EVERY MORNING
Status: DISCONTINUED | OUTPATIENT
Start: 2020-07-09 | End: 2020-07-10 | Stop reason: HOSPADM

## 2020-07-09 RX ORDER — DEXTROSE 40 %
15-30 GEL (GRAM) ORAL AS NEEDED
Status: DISCONTINUED | OUTPATIENT
Start: 2020-07-09 | End: 2020-07-10 | Stop reason: HOSPADM

## 2020-07-09 RX ORDER — HYDROMORPHONE HYDROCHLORIDE 1 MG/ML
0.5 INJECTION, SOLUTION INTRAMUSCULAR; INTRAVENOUS; SUBCUTANEOUS
Status: DISCONTINUED | OUTPATIENT
Start: 2020-07-09 | End: 2020-07-09

## 2020-07-09 RX ORDER — SODIUM CHLORIDE 9 MG/ML
INJECTION, SOLUTION INTRAVENOUS CONTINUOUS
Status: DISCONTINUED | OUTPATIENT
Start: 2020-07-09 | End: 2020-07-09 | Stop reason: HOSPADM

## 2020-07-09 RX ORDER — FENTANYL CITRATE 50 UG/ML
50 INJECTION, SOLUTION INTRAMUSCULAR; INTRAVENOUS
Status: DISCONTINUED | OUTPATIENT
Start: 2020-07-09 | End: 2020-07-09

## 2020-07-09 RX ORDER — NAPROXEN 250 MG/1
500 TABLET ORAL
Status: COMPLETED | OUTPATIENT
Start: 2020-07-09 | End: 2020-07-09

## 2020-07-09 RX ORDER — ONDANSETRON 4 MG/1
4 TABLET, ORALLY DISINTEGRATING ORAL EVERY 8 HOURS PRN
Status: DISCONTINUED | OUTPATIENT
Start: 2020-07-09 | End: 2020-07-10 | Stop reason: HOSPADM

## 2020-07-09 RX ORDER — LABETALOL HCL 20 MG/4 ML
5 SYRINGE (ML) INTRAVENOUS AS NEEDED
Status: DISCONTINUED | OUTPATIENT
Start: 2020-07-09 | End: 2020-07-09

## 2020-07-09 RX ORDER — KETOROLAC TROMETHAMINE 15 MG/ML
15 INJECTION, SOLUTION INTRAMUSCULAR; INTRAVENOUS
Status: DISCONTINUED | OUTPATIENT
Start: 2020-07-09 | End: 2020-07-10 | Stop reason: HOSPADM

## 2020-07-09 RX ORDER — DEXAMETHASONE SODIUM PHOSPHATE 4 MG/ML
8 INJECTION, SOLUTION INTRA-ARTICULAR; INTRALESIONAL; INTRAMUSCULAR; INTRAVENOUS; SOFT TISSUE ONCE
Status: COMPLETED | OUTPATIENT
Start: 2020-07-10 | End: 2020-07-10

## 2020-07-09 RX ORDER — ACETAMINOPHEN 325 MG/1
TABLET ORAL
Status: COMPLETED
Start: 2020-07-09 | End: 2020-07-09

## 2020-07-09 RX ORDER — DEXTROSE 50 % IN WATER (D50W) INTRAVENOUS SYRINGE
25 AS NEEDED
Status: DISCONTINUED | OUTPATIENT
Start: 2020-07-09 | End: 2020-07-10 | Stop reason: HOSPADM

## 2020-07-09 RX ORDER — PROPOFOL 10 MG/ML
INJECTION, EMULSION INTRAVENOUS CONTINUOUS PRN
Status: DISCONTINUED | OUTPATIENT
Start: 2020-07-09 | End: 2020-07-09 | Stop reason: SURG

## 2020-07-09 RX ORDER — FENTANYL CITRATE 50 UG/ML
INJECTION, SOLUTION INTRAMUSCULAR; INTRAVENOUS AS NEEDED
Status: DISCONTINUED | OUTPATIENT
Start: 2020-07-09 | End: 2020-07-09 | Stop reason: SURG

## 2020-07-09 RX ORDER — GABAPENTIN 300 MG/1
300 CAPSULE ORAL 3 TIMES DAILY
Status: DISCONTINUED | OUTPATIENT
Start: 2020-07-09 | End: 2020-07-10 | Stop reason: HOSPADM

## 2020-07-09 RX ORDER — GABAPENTIN 300 MG/1
CAPSULE ORAL
Status: COMPLETED
Start: 2020-07-09 | End: 2020-07-09

## 2020-07-09 RX ORDER — NAPROXEN SODIUM 220 MG/1
81 TABLET, FILM COATED ORAL 2 TIMES DAILY
Status: DISCONTINUED | OUTPATIENT
Start: 2020-07-09 | End: 2020-07-10 | Stop reason: HOSPADM

## 2020-07-09 RX ORDER — ALUMINUM HYDROXIDE, MAGNESIUM HYDROXIDE, AND SIMETHICONE 1200; 120; 1200 MG/30ML; MG/30ML; MG/30ML
30 SUSPENSION ORAL EVERY 4 HOURS PRN
Status: DISCONTINUED | OUTPATIENT
Start: 2020-07-09 | End: 2020-07-10 | Stop reason: HOSPADM

## 2020-07-09 RX ORDER — MIDAZOLAM HYDROCHLORIDE 2 MG/2ML
INJECTION, SOLUTION INTRAMUSCULAR; INTRAVENOUS AS NEEDED
Status: DISCONTINUED | OUTPATIENT
Start: 2020-07-09 | End: 2020-07-09 | Stop reason: SURG

## 2020-07-09 RX ORDER — OXYCODONE HYDROCHLORIDE 5 MG/1
5-10 TABLET ORAL EVERY 4 HOURS PRN
Status: DISCONTINUED | OUTPATIENT
Start: 2020-07-09 | End: 2020-07-10 | Stop reason: HOSPADM

## 2020-07-09 RX ORDER — DIPHENHYDRAMINE HCL 25 MG
25 CAPSULE ORAL EVERY 6 HOURS PRN
Status: DISCONTINUED | OUTPATIENT
Start: 2020-07-09 | End: 2020-07-10 | Stop reason: HOSPADM

## 2020-07-09 RX ORDER — DIPHENHYDRAMINE HCL 50 MG/ML
25 VIAL (ML) INJECTION EVERY 6 HOURS PRN
Status: DISCONTINUED | OUTPATIENT
Start: 2020-07-09 | End: 2020-07-10 | Stop reason: HOSPADM

## 2020-07-09 RX ORDER — VANCOMYCIN HYDROCHLORIDE 500 MG/10ML
INJECTION, POWDER, LYOPHILIZED, FOR SOLUTION INTRAVENOUS AS NEEDED
Status: DISCONTINUED | OUTPATIENT
Start: 2020-07-09 | End: 2020-07-09 | Stop reason: HOSPADM

## 2020-07-09 RX ORDER — ACETAMINOPHEN 325 MG/1
975 TABLET ORAL
Status: COMPLETED | OUTPATIENT
Start: 2020-07-09 | End: 2020-07-09

## 2020-07-09 RX ORDER — LOSARTAN POTASSIUM 100 MG/1
100 TABLET ORAL EVERY MORNING
Status: DISCONTINUED | OUTPATIENT
Start: 2020-07-09 | End: 2020-07-10 | Stop reason: HOSPADM

## 2020-07-09 RX ORDER — AMOXICILLIN 250 MG
1 CAPSULE ORAL 2 TIMES DAILY
Status: DISCONTINUED | OUTPATIENT
Start: 2020-07-09 | End: 2020-07-10 | Stop reason: HOSPADM

## 2020-07-09 RX ORDER — ACETAMINOPHEN 325 MG/1
650 TABLET ORAL
Status: DISCONTINUED | OUTPATIENT
Start: 2020-07-09 | End: 2020-07-10 | Stop reason: HOSPADM

## 2020-07-09 RX ORDER — ATORVASTATIN CALCIUM 10 MG/1
10 TABLET, FILM COATED ORAL DAILY
Status: DISCONTINUED | OUTPATIENT
Start: 2020-07-09 | End: 2020-07-10 | Stop reason: HOSPADM

## 2020-07-09 RX ORDER — ROPIVACAINE HYDROCHLORIDE 5 MG/ML
INJECTION, SOLUTION EPIDURAL; INFILTRATION; PERINEURAL AS NEEDED
Status: DISCONTINUED | OUTPATIENT
Start: 2020-07-09 | End: 2020-07-09 | Stop reason: HOSPADM

## 2020-07-09 RX ADMIN — CEFAZOLIN 2 G: 330 INJECTION, POWDER, FOR SOLUTION INTRAMUSCULAR; INTRAVENOUS at 10:01

## 2020-07-09 RX ADMIN — ACETAMINOPHEN 975 MG: 325 TABLET ORAL at 07:35

## 2020-07-09 RX ADMIN — FENTANYL CITRATE 50 MCG: 50 INJECTION, SOLUTION INTRAMUSCULAR; INTRAVENOUS at 09:42

## 2020-07-09 RX ADMIN — MIDAZOLAM HYDROCHLORIDE 1 MG: 1 INJECTION, SOLUTION INTRAMUSCULAR; INTRAVENOUS at 09:40

## 2020-07-09 RX ADMIN — GABAPENTIN 300 MG: 300 CAPSULE ORAL at 07:35

## 2020-07-09 RX ADMIN — NAPROXEN 500 MG: 250 TABLET ORAL at 07:34

## 2020-07-09 RX ADMIN — LOSARTAN POTASSIUM 100 MG: 100 TABLET, FILM COATED ORAL at 15:44

## 2020-07-09 RX ADMIN — GABAPENTIN 300 MG: 300 CAPSULE ORAL at 20:26

## 2020-07-09 RX ADMIN — VANCOMYCIN HYDROCHLORIDE 1250 MG: 1 INJECTION, POWDER, LYOPHILIZED, FOR SOLUTION INTRAVENOUS at 07:34

## 2020-07-09 RX ADMIN — ACETAMINOPHEN 650 MG: 325 TABLET, FILM COATED ORAL at 20:26

## 2020-07-09 RX ADMIN — MIDAZOLAM HYDROCHLORIDE 1 MG: 1 INJECTION, SOLUTION INTRAMUSCULAR; INTRAVENOUS at 09:42

## 2020-07-09 RX ADMIN — SENNOSIDES AND DOCUSATE SODIUM 1 TABLET: 8.6; 5 TABLET ORAL at 20:26

## 2020-07-09 RX ADMIN — KETOROLAC TROMETHAMINE 15 MG: 15 INJECTION, SOLUTION INTRAMUSCULAR; INTRAVENOUS at 20:26

## 2020-07-09 RX ADMIN — TRANEXAMIC ACID 1000 MG: 10 INJECTION, SOLUTION INTRAVENOUS at 09:57

## 2020-07-09 RX ADMIN — GABAPENTIN 300 MG: 300 CAPSULE ORAL at 15:44

## 2020-07-09 RX ADMIN — ASPIRIN 81 MG 81 MG: 81 TABLET ORAL at 20:26

## 2020-07-09 RX ADMIN — ACETAMINOPHEN 650 MG: 325 TABLET, FILM COATED ORAL at 15:44

## 2020-07-09 RX ADMIN — PROPOFOL 25 MCG/KG/MIN: 10 INJECTION, EMULSION INTRAVENOUS at 09:44

## 2020-07-09 RX ADMIN — ACETAMINOPHEN 975 MG: 325 TABLET, FILM COATED ORAL at 07:35

## 2020-07-09 RX ADMIN — BUPIVACAINE HYDROCHLORIDE IN DEXTROSE 2 ML: 7.5 INJECTION, SOLUTION SUBARACHNOID at 09:43

## 2020-07-09 RX ADMIN — SODIUM CHLORIDE 2 G: 9 INJECTION, SOLUTION INTRAVENOUS at 17:30

## 2020-07-09 RX ADMIN — KETOROLAC TROMETHAMINE 15 MG: 15 INJECTION, SOLUTION INTRAMUSCULAR; INTRAVENOUS at 15:44

## 2020-07-09 RX ADMIN — SODIUM CHLORIDE: 9 INJECTION, SOLUTION INTRAVENOUS at 07:34

## 2020-07-09 RX ADMIN — FENTANYL CITRATE 50 MCG: 50 INJECTION, SOLUTION INTRAMUSCULAR; INTRAVENOUS at 09:44

## 2020-07-09 RX ADMIN — ATORVASTATIN CALCIUM 10 MG: 10 TABLET, FILM COATED ORAL at 21:55

## 2020-07-09 RX ADMIN — PANTOPRAZOLE SODIUM 40 MG: 40 TABLET, DELAYED RELEASE ORAL at 15:45

## 2020-07-09 ASSESSMENT — COGNITIVE AND FUNCTIONAL STATUS - GENERAL
AFFECT: WFL
WALKING IN HOSPITAL ROOM: 3 - A LITTLE
HELP NEEDED FOR PERSONAL GROOMING: 4 - NONE
DRESSING REGULAR UPPER BODY CLOTHING: 4 - NONE
STANDING UP FROM CHAIR USING ARMS: 3 - A LITTLE
CLIMB 3 TO 5 STEPS WITH RAILING: 3 - A LITTLE
HELP NEEDED FOR BATHING: 3 - A LITTLE
DRESSING REGULAR UPPER BODY CLOTHING: 4 - NONE
HELP NEEDED FOR BATHING: 3 - A LITTLE
EATING MEALS: 4 - NONE
DRESSING REGULAR LOWER BODY CLOTHING: 3 - A LITTLE
EATING MEALS: 4 - NONE
STANDING UP FROM CHAIR USING ARMS: 3 - A LITTLE
WALKING IN HOSPITAL ROOM: 3 - A LITTLE
MOVING TO AND FROM BED TO CHAIR: 3 - A LITTLE
MOVING TO AND FROM BED TO CHAIR: 3 - A LITTLE
DRESSING REGULAR LOWER BODY CLOTHING: 3 - A LITTLE
TOILETING: 3 - A LITTLE
CLIMB 3 TO 5 STEPS WITH RAILING: 3 - A LITTLE
HELP NEEDED FOR PERSONAL GROOMING: 4 - NONE
AFFECT: WFL
TOILETING: 3 - A LITTLE

## 2020-07-09 NOTE — PROGRESS NOTES
Patient: Anne-Marie Quiroga  Location: [facility] 5PAV 5454  MRN: [id number]  Today's date: 7/9/2020    Pt ended session in bedside chair with chair alarm activated and call bell nearby. NAD/OLYA. RN notified.    Anne-Marie is a 75 y.o. female admitted on 7/9/2020 with S/P total knee arthroplasty, left. Principal problem is Status post total left knee replacement.    Past Medical History  Anne-Marie has a past medical history of Anxiety, Arthritis, Basal cell carcinoma, Depression, History of nuclear stress test (03/06/2020), Hypertension, and Lipid disorder.    History of Present Illness   s/p L TKR    OT Vitals    Date/Time Pulse SpO2 Pt Activity O2 Therapy BP [facility]   07/09/20 1425 78 93 % Walking None (Room air) 134/83 LAP      OT Pain    Date/Time Pain Type Pref Pain Scale Location Rating: Rest Rating: Activity [facility]   07/09/20 1425 Pain Assessment number (Numeric Rating Pain Scale) knee 0 0 LAP          Prior Living Environment      Most Recent Value   Living Arrangements  apartment   Living Environment Comment  lives with spouse. 0 BERN[place]          Prior Level of Function      Most Recent Value   Ambulation  independent   Transferring  independent   Toileting  independent   Bathing  independent   Dressing  independent   Eating  independent   Communication  understands/communicates without difficulty   Prior Level of Function Comment  pt reports IND w/o AD in ADLs/IADLs   Equipment Currently Used at Home  walker, front-wheeled, raised toilet          OT Evaluation and Treatment - 07/09/20 1424        Time Calculation    Start Time  1425     Stop Time  1440     Time Calculation (min)  15 min        Session Details    Document Type  initial evaluation     Mode of Treatment  occupational therapy        General Information    Patient Profile Reviewed?  yes     Onset of Illness/Injury or Date of Surgery  07/09/20     Referring Physician  Ailyn     General Observations of Patient  pt rec'd supine in bed; agreeable to therapy      Existing Precautions/Restrictions  fall;weight bearing        Weight-Bearing Status    Left LE Weight-Bearing Status  weight-bearing as tolerated (WBAT)        Occupational Profile    Reason for Services/Referral (Occupational Profile)  ADL/ambulation dysfunction     Patient Goals (Occupational Profile)  to get stronger and go home        Cognition/Psychosocial    Affect/Mental Status (Cognitive)  WFL     Orientation Status (Cognition)  oriented x 3     Follows Commands (Cognition)  WFL     Cognitive Function (Cognitive)  WFL     Comment, Cognition  pt pleasant and cooperative        Hearing Assessment    Hearing Status  WFL        Vision Assessment/Intervention    Visual Impairment/Limitations  corrective lenses full time        Sensory Assessment (Somatosensory)    Sensory Assessment (Somatosensory)  UE sensation intact        Range of Motion (ROM)    Range of Motion  bilateral upper extremities;ROM is WFL        Strength (Manual Muscle Testing)    Strength (Manual Muscle Testing)  bilateral upper extremities;strength is WFL        Bed Mobility    [place], Supine to Sit  supervision     Assistive Device (Bed Mobility)  head of bed elevated        Transfers    Transfers  other (see comments)    sit stand stand sit    Maintains Weight-Bearing Status (Transfers)  able to maintain weight-bearing status     Comment  pt req SUP for transfers w/ RW        Sit to Stand Transfer    [place], Sit to Stand Transfer  supervision;verbal cues     Verbal Cues  hand placement;safety;technique     Assistive Device  walker, front-wheeled        Stand to Sit Transfer    [place], Stand to Sit Transfer  supervision;verbal cues     Verbal Cues  hand placement;safety;technique     Assistive Device  walker, front-wheeled     Comment  req VC for hand placement and eccentric control        Safety Issues, Functional Mobility    Impairments Affecting Function (Mobility)  endurance/activity tolerance     Comment, Safety  Issues/Impairments (Mobility)  pt reports no pain w/ movement        Balance    Balance Assessment  sitting static balance;sitting dynamic balance;sit to stand dynamic balance;standing static balance;standing dynamic balance     Static Sitting Balance  WFL     Dynamic Sitting Balance  WFL     Sit to Stand Dynamic Balance  mild impairment     Static Standing Balance  mild impairment     Dynamic Standing Balance  mild impairment        Lower Body Dressing    Self-Performance  dons/doffs right sock;dons/doffs left sock     [place]  moderate assist (50-74% patient effort)     Position  long sitting     Adaptive Equipment  none     Comment  pt able to adjust socks after donned, demos good functional reach        AM-PAC (TM) - ADL (Current Function)    Putting on and taking off regular lower body clothing?  3 - A Little     Bathing?  3 - A Little     Toileting?  3 - A Little     Putting on/taking off regular upper body clothing?  4 - None     How much help for taking care of personal grooming?  4 - None     Eating meals?  4 - None     AM-PAC (TM) ADL Score  21        Therapy Assessment/Plan (OT)    Rehab Potential (OT)  good, to achieve stated therapy goals     Therapy Frequency (OT)  3-5 times/wk     Criteria for Skilled Therapeutic Interventions Met (OT)  skilled treatment is necessary     OT Diagnosis  s/p L total knee arthroplasty        Progress Summary (OT)    Daily Outcome Statement (OT)  OT eval complete. [facility] 21. Pt req SUP for bed mobility and transfers w/ RW. Req VC for hand placement for transfers. Mod A for LBD. Anticipate safe d/c home w/ assist from  as needed. OT will continue to follow        Therapy Plan Review/Discharge Plan (OT)    OT Recommended Discharge Disposition  home with assist     Anticipated Equipment Needs At Discharge (OT)  dressing equipment;bathing equipment;shower chair;walker, rolling platform                   Education provided this session. See the Patient Education  summary report for full details.         OT Goals      Most Recent Value   Bed Mobility Goal 1   Activity/Assistive Device  bed mobility activities, all at 07/09/2020 1424   [place]  independent at 07/09/2020 1424   Time Frame  by discharge at 07/09/2020 1424   Progress/Outcome  goal ongoing at 07/09/2020 1424   Transfer Goal 1   Activity/Assistive Device  all transfers at 07/09/2020 1424   [place]  modified independence at 07/09/2020 1424   Time Frame  by discharge at 07/09/2020 1424   Progress/Outcome  goal ongoing at 07/09/2020 1424   Dressing Goal 1   Activity/Adaptive Equipment  lower body dressing at 07/09/2020 1424   [place]  modified independence at 07/09/2020 1424   Time Frame  by discharge at 07/09/2020 1424   Progress/Outcome  goal ongoing at 07/09/2020 1424

## 2020-07-09 NOTE — OP NOTE
Patient Name:  Anne-Marie Quiroga  MR:  [id number]    ATTENDING SURGEON:  Carla Robertson MD    ASSISTANT: Dave Duran MD    PREOPERATIVE DIAGNOSIS:   Primary degenerative arthritis left knee    POSTOPERATIVE DIAGNOSIS: Primary degenerative arthritis left knee    PROCEDURE: Left total knee arthroplasty    Components: [employer]  Tibia: C   Poly: 12 mm UC  Femur: 4 std  Patella: 29    ANESTHESIA: spinal/local       PROCEDURE:  After the patient was examined in the preoperative holding area, the examination was confirmed to be identical to that noted in the office and the knee was initialed.  The patient was brought to the operating room and anesthetized.  A dose of intravenous antibiotics was administered and the lower extremity was prepped and draped in a sterile fashion.  A mid line skin incision was made and a medial peripatellar arthrotomy was performed.  There was advanced arthritis within the joint.  The menisci and the anterior cruciate ligament were excised.  The proximal tibia was resected at a right angle to the long axis of the tibial shaft.  The distal femur was cut parallel to the tibia, both in extension and flexion, with the anterior, posterior, and chamfer cuts made in external rotation creating a rectangular flexion gap. The soft tissues were balanced and osteophytes removed.  The posterior cruciate ligament was balanced.  With a trial of components in place, the knee was stable.  The patella was resurfaced, based on whether it showed signs of wear. The patella tracked well without tilt or subluxation.  The bony surfaces were irrigated with pulsatile lavage and dried.  Antibiotic cement was used to cement the components into place.  Extruded cement was removed.  At the termination of the case, there was full extension and 125 degrees of flexion, excellent stability, and the patella tracked well.  The knee was irrigated, sutured in layers, dry sterile dressing was applied.  The patient was  transferred to the recovery room in stable condition having tolerated the procedure well.  There were no intraoperative complications.    As attending surgeon, I personally performed or supervised the critical components of the procedure.      Carla Robertson MD

## 2020-07-09 NOTE — HOSPITAL COURSE
Anne-Marie is a 75 y.o. female admitted on 7/9/2020 with S/P total knee arthroplasty, left. Principal problem is Status post total left knee replacement.    Past Medical History  Anne-Marie has a past medical history of Anxiety, Arthritis, Basal cell carcinoma, Depression, History of nuclear stress test (03/06/2020), Hypertension, and Lipid disorder.    History of Present Illness   s/p L TKR

## 2020-07-09 NOTE — ANESTHESIA PROCEDURE NOTES
Spinal Block    Patient location during procedure: OR  Start time: 7/9/2020 9:42 AM  Staffing  Anesthesiologist: Brittani Francisco DO  Performed: anesthesiologist   Reason for block: primary anesthetic  Preanesthetic Checklist  Completed: patient identified, site marked, surgical consent, pre-op evaluation, timeout performed, IV checked, risks and benefits discussed, monitors and equipment checked and sterile field maintained during procedure  Spinal Block  Patient position: sitting  Prep: ChloraPrep  Patient monitoring: heart rate, continuous pulse ox, blood pressure and cardiac monitor  Approach: midline  Location: L3-4  Injection technique: single-shot  Needle  Needle type: Sprotte   Needle gauge: 24 G  Needle length: 3.5 in  Assessment  Sensory level: T10  Events: cerebrospinal fluid  Medications Administered - 7/9/2020 9:43 AM   Bupivacaine PF (MARCAINE SPINAL) 0.75% intrathecal injection, 2 mL

## 2020-07-09 NOTE — CONSULTS
Hospital Medicine Service -  Daily Progress Note       SUBJECTIVE   Interval History:   Patient reports that she is doing well post-op. Denies L knee pain currently. She does note a mild HA. Otherwise denies dizziness, CP, dyspnea, abdominal pain, N/V.     OBJECTIVE      Vital signs in last 24 hours:  Temp:  [36.6 °C (97.8 °F)-37.3 °C (99.1 °F)] 36.6 °C (97.8 °F)  Heart Rate:  [64-84] 74  Resp:  [16-25] 25  BP: ()/(50-93) 119/93    Intake/Output Summary (Last 24 hours) at 7/9/2020 1337  Last data filed at 7/9/2020 1303  Gross per 24 hour   Intake 1050 ml   Output --   Net 1050 ml       PHYSICAL EXAMINATION      Physical Exam   Constitutional: She is oriented to person, place, and time. She appears well-developed and well-nourished. No distress.   HENT:   Head: Normocephalic and atraumatic.   Eyes: Conjunctivae are normal. No scleral icterus.   Neck: Normal range of motion. Neck supple.   Cardiovascular: Normal rate, regular rhythm, normal heart sounds and intact distal pulses. Exam reveals no gallop and no friction rub.   No murmur heard.  Pulmonary/Chest: Effort normal and breath sounds normal. No stridor. No respiratory distress. She has no wheezes.   Abdominal: Soft. Bowel sounds are normal. She exhibits no distension. There is no tenderness. There is no guarding.   Musculoskeletal:   L Knee: dressing C/D/I, +DP/PT pulses, unable to fully assess motor/sensory function 2/2 spinal anesthesia   Neurological: She is alert and oriented to person, place, and time.   Skin: Skin is warm and dry.   Psychiatric: She has a normal mood and affect.   Nursing note and vitals reviewed.     LINES, CATHETERS, DRAINS, AIRWAYS, AND WOUNDS   Lines, Drains, Airways, Wounds:  Peripheral IV 07/09/20 Left;Posterior Hand (Active)   Number of days: 0       Surgical Incision Knee Left (Active)   Number of days: 0       Comments:      LABS / IMAGING / TELE      Labs  Old outpatient labs from 7/1/2020 personally reviewed; K 3.8, Hgb  14.0.    Imaging  I have independently reviewed the pertinent imaging from the last 24 hrs.    ECG/Telemetry  N/A    ASSESSMENT AND PLAN      * Status post total left knee replacement  Assessment & Plan  POD# 0 s/p L TKA  - Pain control per orthopedics  - DVT ppx per orthopedics  - PT/OT  - Encourage IS  - Bowel regimen  - Follow post op labs    Hyperlipidemia  Assessment & Plan  - Continue statin    HTN (hypertension)  Assessment & Plan  - Contine Cardizem  - Hold Cozaar/HCTZ until post-op labs checked and renal function is stable  - Supportive care w/ pain control  - Monitor VS    Major depressive disorder  Assessment & Plan  Takes Effexor  - Continue home meds    Anxiety  Assessment & Plan  Takes Effexor  - Continue home meds       MABLE Garner  7/9/2020     The patient was evaluated and managed by the PA. I have personally seen and examined the patient.  I reviewed the note above and agree with the findings and plan of care, with an addendum documented below    Pt seen and examined. Resting in bed. No chest pain/dyspnea.     Heart: RRR  Lungs- cta b/l  abd - soft, +BS   Neuro - Alert/awake. No slurred speech  HEENT- sclera anicteric  LE - sequential compression device     Cont w pain management. rec PT/OT eval and treat. Further management per ortho surgery team. I would encourage incentive spirometry to assist with minimizing emmy-operative pulmonary risk. Pharmacological DVT prophylaxis and timing of such per the discretion of the surgeon. Strongly recommend maintain sequential compression device.    Niraj Faustin DO

## 2020-07-09 NOTE — BRIEF OP NOTE
Left Total Knee Replacement (L) Procedure Note    Procedure:    Left Total Knee Replacement  CPT(R) Code:  62377 - RI TOTAL KNEE ARTHROPLASTY      Pre-op Diagnosis     * Osteoarthritis of left knee, unspecified osteoarthritis type [M17.12]       Post-op Diagnosis     * Osteoarthritis of left knee, unspecified osteoarthritis type [M17.12]    Surgeon(s) and Role:     * Carla Robertson MD - Primary     * Dave Duran MD - Fellow    Anesthesia: Spinal    Staff:   Circulator: Hillary Lawrence RN  Scrub Person: Orin Robb RN    Procedure Details   Left TKA, uncomplicated    Estimated Blood Loss: <50 ml's    Specimens:                No specimens collected during this procedure.      Drains: * No LDAs found *    Implants:   Implant Name Type Inv. Item Serial No.  Lot No. LRB No. Used   CEMENT BONE SIMPLEX FULL DOSE - [id number]  CEMENT BONE SIMPLEX FULL DOSE  [employer] [id number] Left 1   COMPONENT PATELLAR                                      ALL - [id number] Patellar implant COMPONENT PATELLAR                                      ALL  [employer] [id number] Left 1   FEMORAL COMP CR SZ 4/LEFT STD PERSONA - [id number]  FEMORAL COMP CR SZ 4/LEFT STD PERSONA  [employer] [id number] Left 1   ARTICULAR SURFACE    [employer] [id number] Left 1   COMPONENT TIBIAL STEM PERSONA - [id number]  COMPONENT TIBIAL STEM PERSONA  [employer] [id number] Left 1   PERSONA KNEE TAPERED CEMENTED 14 X +30MM - [id number]  PERSONA KNEE TAPERED CEMENTED 14 X +30MM  [employer] [id number] Left 1              Complications:  None; patient tolerated the procedure well.           Disposition: PACU - hemodynamically stable.           Condition: stable    Carla Robertson MD  Phone Number: [phone]

## 2020-07-09 NOTE — ANESTHESIOLOGIST PRE-PROCEDURE ATTESTATION
Pre-Procedure Patient Identification:  I am the Primary Anesthesiologist and have identified the patient on 07/09/20 at 8:25 AM.   I have confirmed the following procedure(s) Left Total Knee Replacement (L) will be performed by the following surgeon/proceduralist Carla Robertson MD.

## 2020-07-09 NOTE — ANESTHESIA POSTPROCEDURE EVALUATION
Patient: Anne-Marie Quiroga    Procedure Summary     Date:  07/09/20 Room / Location:  [facility] PAV OR 02 / [facility] OR PAV    Anesthesia Start:  0939 Anesthesia Stop:  1143    Procedure:  Left Total Knee Replacement (Left Knee) Diagnosis:       Osteoarthritis of left knee, unspecified osteoarthritis type      (Osteoarthritis of left knee)    Surgeon:  Carla Robertson MD Responsible Provider:  Brittani Francisco DO    Anesthesia Type:  spinal ASA Status:  2          Anesthesia Type: spinal  PACU Vitals     No data found in the last 10 encounters.            Anesthesia Post Evaluation    Pain management: adequate  Patient location during evaluation: PACU  Patient participation: complete - patient participated  Level of consciousness: awake and alert  Cardiovascular status: acceptable  Airway Patency: adequate  Respiratory status: acceptable  Hydration status: acceptable  Anesthetic complications: no

## 2020-07-09 NOTE — PROGRESS NOTES
Orthopedic Post Surgical Note    74 y/o female s/p left total knee arthroplasty    Physical Exam:  - dressing clean, dry, intact with mepilex to left knee  - sensation intact to light touch  - palpable DP, PT pulses  - active dorsiflexion, plantarflexion, extensor hallucis longus    A/P:   - pain control  - physical therapy/occupational therapy  - DVT prophylaxis  .

## 2020-07-09 NOTE — ASSESSMENT & PLAN NOTE
POD# 1 s/p L TKA  - Pain control per orthopedics  - DVT ppx per orthopedics  - PT/OT  - Encourage IS  - Bowel regimen  - Post-op labs reviewed

## 2020-07-09 NOTE — PROGRESS NOTES
Patient: Anne-Marie Quiroga  Location: [facility] 5PAV [id number]  MRN: [id number]  Today's date: 7/9/2020  Pt left seated in bedside chair. NAD. Call bell within reach. Chair alarm activated.  Anne-Marie is a 75 y.o. female admitted on 7/9/2020 with S/P total knee arthroplasty, left. Principal problem is Status post total left knee replacement.    Past Medical History  Anne-Marie has a past medical history of Anxiety, Arthritis, Basal cell carcinoma, Depression, History of nuclear stress test (03/06/2020), Hypertension, and Lipid disorder.    History of Present Illness   s/p L TKR    PT Vitals    Date/Time Pulse SpO2 Pt Activity O2 Therapy BP [facility]   07/09/20 1425 78 93 % Walking None (Room air) 134/83 LAP      PT Pain    Date/Time Pain Type Pref Pain Scale Location Rating: Rest Rating: Activity [facility]   07/09/20 1425 Pain Assessment number (Numeric Rating Pain Scale) knee 0 0 LAP          Prior Living Environment      Most Recent Value   Living Arrangements  apartment   Living Environment Comment  lives with spouse. 0 [place]          Prior Level of Function      Most Recent Value   Ambulation  independent   Transferring  independent   Toileting  independent   Bathing  independent   Dressing  independent   Equipment Currently Used at Home  none          PT Evaluation and Treatment - 07/09/20 1425        Time Calculation    Start Time  1425     Stop Time  1446     Time Calculation (min)  21 min        Session Details    Document Type  initial evaluation     Mode of Treatment  physical therapy        General Information    Patient Profile Reviewed?  yes     Existing Precautions/Restrictions  fall;weight bearing        Weight-Bearing Status    Left LE Weight-Bearing Status  weight-bearing as tolerated (WBAT)        Cognition/Psychosocial    Affect/Mental Status (Cognitive)  WFL        Sensory Assessment (Somatosensory)    Sensory Assessment (Somatosensory)  LE sensation intact        Range of Motion (ROM)    Range of Motion  ROM is  WFL;bilateral lower extremities;other (see comments)    LLE not tested       Strength (Manual Muscle Testing)    Strength (Manual Muscle Testing)  strength is WFL;bilateral lower extremities;other (see comments)    LLE not tested       Bed Mobility    [place], Supine to Sit  supervision     Assistive Device (Bed Mobility)  head of bed elevated        Sit to Stand Transfer    [place], Sit to Stand Transfer  supervision     Assistive Device  walker, front-wheeled        Stand to Sit Transfer    [place], Stand to Sit Transfer  supervision     Assistive Device  walker, front-wheeled        Gait Training    [place], Gait  supervision     Assistive Device  walker, front-wheeled     Distance in Feet  120 feet     Gait Pattern Utilized  step-to     Deviations/Abnormal Patterns (Gait)  antalgic;step length decreased;stride length decreased;gait speed decreased     Comment  pt denies any pain during ambulation        Balance    Balance Assessment  standing dynamic balance     Dynamic Standing Balance  mild impairment        AM-PAC (TM) - Mobility (Current Function)    Turning from your back to your side while in a flat bed without using bedrails?  3 - A Little     Moving from lying on your back to sitting on the side of a flat bed without using bedrails?  3 - A Little     Moving to and from a bed to a chair?  3 - A Little     Standing up from a chair using your arms?  3 - A Little     To walk in a hospital room?  3 - A Little     Climbing 3-5 steps with a railing?  3 - A Little     AM-PAC (TM) Mobility Score  18        Therapy Assessment/Plan (PT)    Rehab Potential (PT)  good, to achieve stated therapy goals     Therapy Frequency (PT)  daily        Progress Summary (PT)    Daily Outcome Statement (PT)  pt ambulating in hallways with RW. denies any pain        Therapy Plan Review/Discharge Plan (PT)    PT Recommended Discharge Disposition  home with outpatient services     Anticipated Equipment Needs at  Discharge (PT Eval)  none                       Education provided this session. See the Patient Education summary report for full details.    PT Goals      Most Recent Value   Bed Mobility Goal 1   Activity/Assistive Device  bed mobility activities, all at 07/09/2020 1425   [place]  independent at 07/09/2020 1425   Time Frame  by discharge at 07/09/2020 1425   Progress/Outcome  goal ongoing at 07/09/2020 1425   Transfer Goal 1   Activity/Assistive Device  all transfers at 07/09/2020 1425   [place]  independent at 07/09/2020 1425   Time Frame  by discharge at 07/09/2020 1425   Progress/Outcome  goal ongoing at 07/09/2020 1425   Gait Training Goal 1   Activity/Assistive Device  gait (walking locomotion), walker, front-wheeled at 07/09/2020 1425   [place]  modified independence at 07/09/2020 1425   Distance  200 at 07/09/2020 1425   Time Frame  by discharge at 07/09/2020 1425   Progress/Outcome  goal ongoing at 07/09/2020 1425   Stairs Goal 1   Activity/Assistive Device  stairs, all skills at 07/09/2020 1425   [place]  modified independence at 07/09/2020 1425   Number of Stairs  4 at 07/09/2020 1425   Time Frame  by discharge at 07/09/2020 1425   Strategies/Barriers  NOT REQUIRED FOR DC at 07/09/2020 1425   Progress/Outcome  goal ongoing at 07/09/2020 1425

## 2020-07-09 NOTE — ANESTHESIA PREPROCEDURE EVALUATION
Relevant Problems   CARDIOVASCULAR   (+) HTN (hypertension)      MUSCULOSKELETAL   (+) Osteoarthritis of multiple joints      NEUROLOGY   (+) Anxiety   (+) Major depressive disorder      Other   (+) Hyperlipidemia     CBC Results       07/01/20                          1155           WBC 9.15           RBC 4.52           HGB 14.0           HCT 41.4           MCV 91.6           MCH 31.0           MCHC 33.8                                    BMP Results       07/01/20                          1155                      K 3.8           Cl 99           CO2 26           Glucose 94           BUN 13           Creatinine 0.6           Calcium 8.9           Anion Gap 11           EGFR >60.0                           ROS/Med Hx     Past Surgical History:   Procedure Laterality Date   • BASAL CELL CARCINOMA EXCISION      left eye area/left nasal   • CHOLECYSTECTOMY     • COLONOSCOPY W/ POLYPECTOMY     • HYSTERECTOMY         Physical Exam    Airway   Mallampati: II   TM distance: >3 FB   Neck ROM: full  Cardiovascular - normal   Rhythm: regular   Rate: normalPulmonary - normal   clear to auscultation  Dental - normal        Anesthesia Plan    Plan: spinal    Technique: spinal     Lines and Monitors: PIV     Airway: natural airway / supplemental oxygen   ASA 2  Blood Products:   Use of Blood Products Discussed: No   Anesthetic plan and risks discussed with: patient  Induction:    intravenous   Postop Plan:   Patient Disposition: inpatient floor planned admission

## 2020-07-09 NOTE — ASSESSMENT & PLAN NOTE
- Contine Cardizem and Cozaar  - Resume HCTZ on dc  - Supportive care w/ pain control  - Monitor VS

## 2020-07-10 VITALS
HEART RATE: 100 BPM | DIASTOLIC BLOOD PRESSURE: 69 MMHG | RESPIRATION RATE: 20 BRPM | HEIGHT: 65 IN | WEIGHT: 184.86 LBS | TEMPERATURE: 98.4 F | BODY MASS INDEX: 30.8 KG/M2 | SYSTOLIC BLOOD PRESSURE: 122 MMHG | OXYGEN SATURATION: 95 %

## 2020-07-10 LAB
ANION GAP SERPL CALC-SCNC: 7 MEQ/L (ref 3–15)
BUN SERPL-MCNC: 10 MG/DL (ref 8–20)
CALCIUM SERPL-MCNC: 8.2 MG/DL (ref 8.9–10.3)
CHLORIDE SERPL-SCNC: 109 MEQ/L (ref 98–109)
CO2 SERPL-SCNC: 23 MEQ/L (ref 22–32)
CREAT SERPL-MCNC: 0.5 MG/DL (ref 0.6–1.1)
ERYTHROCYTE [DISTWIDTH] IN BLOOD BY AUTOMATED COUNT: 13.5 % (ref 11.7–14.4)
GFR SERPL CREATININE-BSD FRML MDRD: >60 ML/MIN/1.73M*2
GLUCOSE SERPL-MCNC: 137 MG/DL (ref 70–99)
HCT VFR BLDCO AUTO: 34.6 % (ref 35–45)
HGB BLD-MCNC: 11.7 G/DL (ref 11.8–15.7)
MCH RBC QN AUTO: 31.6 PG (ref 28–33.2)
MCHC RBC AUTO-ENTMCNC: 33.8 G/DL (ref 32.2–35.5)
MCV RBC AUTO: 93.5 FL (ref 83–98)
PDW BLD AUTO: 9.3 FL (ref 9.4–12.3)
PLATELET # BLD AUTO: 213 K/UL (ref 150–369)
POTASSIUM SERPL-SCNC: 3.5 MEQ/L (ref 3.6–5.1)
RBC # BLD AUTO: 3.7 M/UL (ref 3.93–5.22)
SODIUM SERPL-SCNC: 139 MEQ/L (ref 136–144)
WBC # BLD AUTO: 9.77 K/UL (ref 3.8–10.5)

## 2020-07-10 PROCEDURE — 99232 SBSQ HOSP IP/OBS MODERATE 35: CPT | Performed by: HOSPITALIST

## 2020-07-10 PROCEDURE — 80048 BASIC METABOLIC PNL TOTAL CA: CPT | Performed by: NURSE PRACTITIONER

## 2020-07-10 PROCEDURE — 85027 COMPLETE CBC AUTOMATED: CPT | Performed by: NURSE PRACTITIONER

## 2020-07-10 PROCEDURE — 36415 COLL VENOUS BLD VENIPUNCTURE: CPT | Performed by: NURSE PRACTITIONER

## 2020-07-10 PROCEDURE — 63700000 HC SELF-ADMINISTRABLE DRUG: Performed by: NURSE PRACTITIONER

## 2020-07-10 PROCEDURE — 97150 GROUP THERAPEUTIC PROCEDURES: CPT | Mod: GO

## 2020-07-10 PROCEDURE — 97535 SELF CARE MNGMENT TRAINING: CPT | Mod: GO

## 2020-07-10 PROCEDURE — 25800000 HC PHARMACY IV SOLUTIONS: Performed by: NURSE PRACTITIONER

## 2020-07-10 PROCEDURE — 97116 GAIT TRAINING THERAPY: CPT | Mod: GP,CQ

## 2020-07-10 PROCEDURE — 97150 GROUP THERAPEUTIC PROCEDURES: CPT | Mod: GP,CQ

## 2020-07-10 PROCEDURE — 63600000 HC DRUGS/DETAIL CODE: Performed by: NURSE PRACTITIONER

## 2020-07-10 RX ORDER — POTASSIUM CHLORIDE 750 MG/1
40 TABLET, FILM COATED, EXTENDED RELEASE ORAL ONCE
Status: COMPLETED | OUTPATIENT
Start: 2020-07-10 | End: 2020-07-10

## 2020-07-10 RX ORDER — PANTOPRAZOLE SODIUM 40 MG/1
40 TABLET, DELAYED RELEASE ORAL DAILY
Qty: 30 TABLET | Refills: 0
Start: 2020-07-10 | End: 2020-08-09

## 2020-07-10 RX ORDER — TRAMADOL HYDROCHLORIDE 50 MG/1
50 TABLET ORAL EVERY 8 HOURS PRN
Qty: 40 TABLET | Refills: 0 | Status: SHIPPED | OUTPATIENT
Start: 2020-07-10 | End: 2020-07-24

## 2020-07-10 RX ORDER — GABAPENTIN 300 MG/1
300 CAPSULE ORAL 3 TIMES DAILY
Qty: 21 CAPSULE | Refills: 4
Start: 2020-07-10 | End: 2020-08-09

## 2020-07-10 RX ORDER — OXYCODONE HYDROCHLORIDE 5 MG/1
5-10 TABLET ORAL EVERY 4 HOURS PRN
Qty: 30 TABLET | Refills: 0 | Status: SHIPPED | OUTPATIENT
Start: 2020-07-10 | End: 2020-07-15

## 2020-07-10 RX ORDER — ACETAMINOPHEN 325 MG/1
650 TABLET ORAL EVERY 6 HOURS PRN
Start: 2020-07-10 | End: 2020-08-09

## 2020-07-10 RX ORDER — POLYETHYLENE GLYCOL 3350 17 G/17G
17 POWDER, FOR SOLUTION ORAL DAILY PRN
Start: 2020-07-10 | End: 2020-07-13

## 2020-07-10 RX ORDER — NAPROXEN SODIUM 220 MG/1
81 TABLET, FILM COATED ORAL 2 TIMES DAILY
Qty: 60 TABLET | Refills: 0
Start: 2020-07-10 | End: 2020-08-09

## 2020-07-10 RX ORDER — AMOXICILLIN 250 MG
1 CAPSULE ORAL 2 TIMES DAILY
Qty: 60 TABLET | Refills: 0
Start: 2020-07-10 | End: 2020-08-09

## 2020-07-10 RX ADMIN — KETOROLAC TROMETHAMINE 15 MG: 15 INJECTION, SOLUTION INTRAMUSCULAR; INTRAVENOUS at 02:13

## 2020-07-10 RX ADMIN — KETOROLAC TROMETHAMINE 15 MG: 15 INJECTION, SOLUTION INTRAMUSCULAR; INTRAVENOUS at 08:32

## 2020-07-10 RX ADMIN — DEXAMETHASONE SODIUM PHOSPHATE 8 MG: 4 INJECTION, SOLUTION INTRAMUSCULAR; INTRAVENOUS at 05:12

## 2020-07-10 RX ADMIN — POLYETHYLENE GLYCOL 3350 17 G: 17 POWDER, FOR SOLUTION ORAL at 08:28

## 2020-07-10 RX ADMIN — LOSARTAN POTASSIUM 100 MG: 100 TABLET, FILM COATED ORAL at 08:36

## 2020-07-10 RX ADMIN — GABAPENTIN 300 MG: 300 CAPSULE ORAL at 13:31

## 2020-07-10 RX ADMIN — SODIUM CHLORIDE 2 G: 9 INJECTION, SOLUTION INTRAVENOUS at 02:14

## 2020-07-10 RX ADMIN — ACETAMINOPHEN 650 MG: 325 TABLET, FILM COATED ORAL at 13:31

## 2020-07-10 RX ADMIN — DILTIAZEM HYDROCHLORIDE 240 MG: 240 CAPSULE, COATED, EXTENDED RELEASE ORAL at 08:36

## 2020-07-10 RX ADMIN — PANTOPRAZOLE SODIUM 40 MG: 40 TABLET, DELAYED RELEASE ORAL at 09:00

## 2020-07-10 RX ADMIN — VENLAFAXINE HYDROCHLORIDE 225 MG: 150 CAPSULE, EXTENDED RELEASE ORAL at 08:28

## 2020-07-10 RX ADMIN — SODIUM CHLORIDE: 9 INJECTION, SOLUTION INTRAVENOUS at 02:13

## 2020-07-10 RX ADMIN — POTASSIUM CHLORIDE 40 MEQ: 750 TABLET, FILM COATED, EXTENDED RELEASE ORAL at 11:06

## 2020-07-10 RX ADMIN — ACETAMINOPHEN 650 MG: 325 TABLET, FILM COATED ORAL at 02:13

## 2020-07-10 RX ADMIN — GABAPENTIN 300 MG: 300 CAPSULE ORAL at 08:36

## 2020-07-10 RX ADMIN — ACETAMINOPHEN 650 MG: 325 TABLET, FILM COATED ORAL at 08:28

## 2020-07-10 RX ADMIN — SENNOSIDES AND DOCUSATE SODIUM 1 TABLET: 8.6; 5 TABLET ORAL at 08:28

## 2020-07-10 RX ADMIN — OXYCODONE HYDROCHLORIDE 5 MG: 5 TABLET ORAL at 08:51

## 2020-07-10 RX ADMIN — ASPIRIN 81 MG 81 MG: 81 TABLET ORAL at 08:29

## 2020-07-10 RX ADMIN — ATORVASTATIN CALCIUM 10 MG: 10 TABLET, FILM COATED ORAL at 08:36

## 2020-07-10 ASSESSMENT — COGNITIVE AND FUNCTIONAL STATUS - GENERAL
MOVING TO AND FROM BED TO CHAIR: 4 - NONE
AFFECT: WFL
TOILETING: 4 - NONE
DRESSING REGULAR LOWER BODY CLOTHING: 4 - NONE
AFFECT: WFL
CLIMB 3 TO 5 STEPS WITH RAILING: 4 - NONE
HELP NEEDED FOR BATHING: 4 - NONE
DRESSING REGULAR UPPER BODY CLOTHING: 4 - NONE
EATING MEALS: 4 - NONE
HELP NEEDED FOR PERSONAL GROOMING: 4 - NONE
WALKING IN HOSPITAL ROOM: 3 - A LITTLE
STANDING UP FROM CHAIR USING ARMS: 4 - NONE

## 2020-07-10 NOTE — PROGRESS NOTES
Patient: Anne-Marie Quiroga  Location: [facility] 5PAV 5454  MRN: [id number]  Today's date: 7/10/2020     Pt seated in bedside chair at end of OT session, NAD. Call bell and phone in reach, chair alarm activated and nursing notified.       Anne-Marie is a 75 y.o. female admitted on 7/9/2020 with S/P total knee arthroplasty, left. Principal problem is Status post total left knee replacement.    Past Medical History  Anne-Marie has a past medical history of Anxiety, Arthritis, Basal cell carcinoma, Depression, History of nuclear stress test (03/06/2020), Hypertension, and Lipid disorder.    History of Present Illness   s/p L TKR    OT Pain    Date/Time Pain Type Pref Pain Scale Side Location Rating: Rest Rating: Activity Interventions [facility]   07/10/20 1015 Pain Assessment number (Numeric Rating Pain Scale) Left knee 3 4 cold applied GJG          Prior Living Environment      Most Recent Value   Living Arrangements  apartment   Living Environment Comment  lives with spouse. 0 [place]          Prior Level of Function      Most Recent Value   Ambulation  independent   Transferring  independent   Toileting  independent   Bathing  independent   Dressing  independent   Eating  independent   Communication  understands/communicates without difficulty   Prior Level of Function Comment  pt reports IND w/o AD in ADLs/IADLs   Equipment Currently Used at Home  walker, front-wheeled, raised toilet          OT Evaluation and Treatment - 07/10/20 1015        Time Calculation    Start Time  1015     Stop Time  1045     Time Calculation (min)  30 min        Session Details    Document Type  daily treatment/progress note     Mode of Treatment  occupational therapy        General Information    Patient Profile Reviewed?  yes     Onset of Illness/Injury or Date of Surgery  07/09/20     Referring Physician  Ailyn     General Observations of Patient  pt in gym agreeable to OT      Existing Precautions/Restrictions  fall;weight bearing         Weight-Bearing Status    Left LE Weight-Bearing Status  weight-bearing as tolerated (WBAT)        Cognition/Psychosocial    Affect/Mental Status (Cognitive)  WFL     Orientation Status (Cognition)  oriented x 4     Follows Commands (Cognition)  WFL     Cognitive Function (Cognitive)  WFL        Bed Mobility    Bed Mobility  bed mobility (all) activities     Falls, All Bed Mobility Activities  independent     Comment (Bed Mobility)  independent in simulated home height bed mobility         Transfers    Transfers  toilet transfer;shower transfer     Maintains Weight-Bearing Status (Transfers)  able to maintain weight-bearing status        Sit to Stand Transfer    [place], Sit to Stand Transfer  modified independence     Assistive Device  walker, front-wheeled        Stand to Sit Transfer    [place], Stand to Sit Transfer  modified independence     Assistive Device  walker, front-wheeled        Toilet Transfer    Transfer Technique  sit-stand;stand-sit     [place], Toilet Transfer  modified independence     Assistive Device  walker, front-wheeled;raised toilet seat     Comment  Pt provided with demonstration and verbal education on performance of safe toilet transfers, provided with recommendation to use commode over toilet at home to raise seat height and promote safety. During this education and demonstration, pt instructed to push from window, cabinet sink or grab bar if present at home - mod I wiith raised toilet seat to simulate home environment         Shower Transfer    Transfer Technique  sit and swing     Falls, Shower Transfer  modified independence     Assistive Device  walker, front-wheeled;tub bench     Comment  Pt provided with verbal instructions and visual demonstration of performance of shower transfer with tub bench and shower chair. Pt recommended to acquire shower chair, and to perform initial transfers with family member or assistance present to ensure safety - Mod I  with tub bench         Balance    Comment, Balance  no lob observed        Bathing    Comment  Pt provided with education on adaptive strategies for bathing like using a shower chair, tub bench and long handle sponge to promote safety and independence. pt verbalized understanding of this education         Lower Body Dressing    Comment  Pt provided with demonstration on how to use and acquire LB dressing adaptive equipment to promote safety and independence with ADL tasks. further education was provided including energy conservation and adaptive strategies like dressing affected LE first. pt verbalized understanding of this education.         Toileting    Comment  Pt provided with education on adaptive equipment to promote safety with toileting including use of commode to promote safety and independence. pt verbalized understanding of this education         AM-PAC (TM) - ADL (Current Function)    Putting on and taking off regular lower body clothing?  4 - None     Bathing?  4 - None     Toileting?  4 - None     Putting on/taking off regular upper body clothing?  4 - None     How much help for taking care of personal grooming?  4 - None     Eating meals?  4 - None     AM-PAC (TM) ADL Score  24        Therapy Assessment/Plan (OT)    Rehab Potential (OT)  --    d/c OT     Therapy Frequency (OT)  --    d/c OT        Progress Summary (OT)    Daily Outcome Statement (OT)  Pt completed OT group session, demonstrated good safety awareness for functional transfers to toilet, shower and bed. Educated on adaptive ADL strategies and appropriate DME. clear for home from OT services at this time          Therapy Plan Review/Discharge Plan (OT)    OT Recommended Discharge Disposition  home with assist     Anticipated Equipment Needs At Discharge (OT)  bathing equipment;commode, 3-in-1;dressing equipment;walker, front-wheeled;tub bench;shower chair;reacher                   Education provided this session. See the Patient Education  summary report for full details.         OT Goals      Most Recent Value   Bed Mobility Goal 1   Activity/Assistive Device  bed mobility activities, all at 07/09/2020 1424   [place]  independent at 07/09/2020 1424   Time Frame  by discharge at 07/09/2020 1424   Progress/Outcome  goal met at 07/10/2020 1015   Transfer Goal 1   Activity/Assistive Device  all transfers at 07/09/2020 1424   [place]  modified independence at 07/09/2020 1424   Time Frame  by discharge at 07/09/2020 1424   Progress/Outcome  goal met at 07/10/2020 1015   Dressing Goal 1   Activity/Adaptive Equipment  lower body dressing at 07/09/2020 1424   [place]  modified independence at 07/09/2020 1424   Time Frame  by discharge at 07/09/2020 1424   Progress/Outcome  goal met at 07/10/2020 1015

## 2020-07-10 NOTE — PROGRESS NOTES
Staff RREYNOLD. Stated patient is requesting home care. Discussed in rounds and will defer to the Ortho Navigator. Call to Adina Lim at [phone]. She approved Home care and will set things up with [facility],. Updated Staff CHELA. Pt is at p.T.

## 2020-07-10 NOTE — DISCHARGE INSTRUCTIONS
DVT Prophylaxis:   -Continue with Aspirin 81 mg by mouth twice daily X 4 weeks for blood clot prevention.     Incision Care:  -Please remove your surgical dressing on 7/14/2020. At that time if the wound is dry and not draining, you can leave it uncovered, open to air. If there is drainage, please place 4x4s covered by paper tape over your incision.  -Please do not submerge incision in water, no baths, no swimming, no pools, no hot tubs, etc.   -Please keep incision clean and dry. Once your dressing has been removed, do not scrub the incision in the shower and pat dry with a clean towel not used to dry your body.   -Please make sure your incision(s) are checked for signs and symptoms of infection: If any of the below should occur, please call the office:   -drainage from incision site, opening of incision, increased redness and/or tenderness, fevers greater than 101, flu-like symptoms.   -Please call office or go to ED with any thigh/calf pain or swelling in legs, chest pain, chest congestion, problems with breathing.     Constipation/Pain Medication:   -Take your pain medication with food. Do not drive while taking pain medication.   -Pain medications may cause constipation.   -If you have not had a bowel movement in 3 days please call the office   - Please take Senna-Colace as prescribed. Hold for loose stool. If you are having difficulties having a bowel   movement or haven't had bowel movement in 2 days, please add over the counter miralax and take as directed on package.   -Drink plenty of fluids and eat fresh fruits and vegetables.   -DO NOT SMOKE! Smoking is not healthy for your healing process.     Celebrex:  - If you were given a prescription for Celebrex 200mg preoperatively by Dr. Robertson,   continue taking Celebrex 200mg one pill by mouth daily for 4 weeks   - A narcotic pain medication prescription and physical therapy prescription

## 2020-07-10 NOTE — PATIENT CARE CONFERENCE
Care Progression Rounds Note  Date: 7/10/2020  Time: 10:14 AM     Patient Name: Anne-Marie Quiroga     Medical Record Number: [id number]   YOB: 1945  Sex: Female      Room/Bed: 5454    Admitting Diagnosis: Osteoarthritis of left knee, unspecified osteoarthritis type [M17.12]  S/P total knee arthroplasty, left [Z96.652]   Admit Date/Time: 7/9/2020  6:35 AM    Primary Diagnosis: Status post total left knee replacement  Principal Problem: Status post total left knee replacement    GMLOS: pending  Anticipated Discharge Date: 7/10/2020    AM-PAC  Mobility Score: 18    Discharge Planning:  Living Arrangements: apartment  Anticipated Discharge Disposition: home without services    Barriers to Discharge:  Barriers to Discharge: Therapy update pending    Participants:  advanced practice provider, , nursing, physical therapy, social work/services

## 2020-07-10 NOTE — PROGRESS NOTES
Orthopaedic Surgery Progress Note    NAEON. Pain well controlled. OOB. Denies chest pain, SOB, fever or chills.     Objective:    Vitals:    07/10/20 0515   BP:    Pulse:    Resp: 17   Temp:    SpO2: 94%     Gen: NAD    LLE  Incision CDI   Appropriate swelling  +EHL/TA/GSC  SILT SPN DPN T Berry Sa  + DP    Assessment and Plan:  75 y.o. female s/p L TKA    -- WBAT  -- DVT PPX: asa  -- PT/OT    Bladimir Shepard MD

## 2020-07-10 NOTE — PROGRESS NOTES
Hospital Medicine Service -  Daily Progress Note       SUBJECTIVE   Interval History:   Patient doing well this morning. She notes that she did not sleep well. However, she denies severe L knee pain.   Patient denies HA, dizziness, CP, SOB, palpitations, Abdominal Pain, N/V, or any other symptoms.   Patient does have questions about discharge plan of care, dressing care, and PT. D/w Ortho.      OBJECTIVE      Vital signs in last 24 hours:  Temp:  [36.4 °C (97.5 °F)-37.3 °C (99.1 °F)] 36.7 °C (98 °F)  Heart Rate:  [64-91] 89  Resp:  [16-26] 21  BP: ()/(50-93) 145/73    Intake/Output Summary (Last 24 hours) at 7/10/2020 0921  Last data filed at 7/10/2020 0130  Gross per 24 hour   Intake 1050 ml   Output 400 ml   Net 650 ml       PHYSICAL EXAMINATION      Physical Exam   Constitutional: She is oriented to person, place, and time. She appears well-developed and well-nourished. No distress.   HENT:   Head: Normocephalic and atraumatic.   Eyes: Conjunctivae are normal. No scleral icterus.   Neck: Normal range of motion. Neck supple.   Cardiovascular: Normal rate, regular rhythm, normal heart sounds and intact distal pulses. Exam reveals no gallop and no friction rub.   No murmur heard.  Pulmonary/Chest: Effort normal and breath sounds normal. No stridor. No respiratory distress. She has no wheezes.   Abdominal: Soft. Bowel sounds are normal. She exhibits no distension. There is no tenderness. There is no guarding.   Musculoskeletal:   L Knee: dressing C/D/I, neurovascularly intact distally, +DP/PT pulses   Neurological: She is alert and oriented to person, place, and time.   Skin: Skin is warm and dry.   Psychiatric: She has a normal mood and affect.   Nursing note and vitals reviewed.     LINES, CATHETERS, DRAINS, AIRWAYS, AND WOUNDS   Lines, Drains, Airways, Wounds:  Peripheral IV 07/09/20 Left;Posterior Hand (Active)   Number of days: 1       Surgical Incision Knee Left (Active)   Number of days: 1        Comments:      LABS / IMAGING / TELE      Labs  Results from last 7 days   Lab Units 07/10/20  0457   SODIUM mEQ/L 139   POTASSIUM mEQ/L 3.5*   CHLORIDE mEQ/L 109   CO2 mEQ/L 23   BUN mg/dL 10   CREATININE mg/dL 0.5*   CALCIUM mg/dL 8.2*   GLUCOSE mg/dL 137*     Results from last 7 days   Lab Units 07/10/20  0457   WBC K/uL 9.77   HEMOGLOBIN g/dL 11.7*   HEMATOCRIT % 34.6*   PLATELETS K/uL 213     Microbiology Results     ** No results found for the last 720 hours. **        Above labs have been personally reviewed.     Imaging  I have independently reviewed the pertinent imaging from the last 24 hrs.    ECG/Telemetry  n/a    ASSESSMENT AND PLAN      * Status post total left knee replacement  Assessment & Plan  POD# 1 s/p L TKA  - Pain control per orthopedics  - DVT ppx per orthopedics  - PT/OT  - Encourage IS  - Bowel regimen  - Post-op labs reviewed    Hyperlipidemia  Assessment & Plan  - Continue statin    HTN (hypertension)  Assessment & Plan  - Contine Cardizem and Cozaar  - Resume HCTZ on dc  - Supportive care w/ pain control  - Monitor VS    Major depressive disorder  Assessment & Plan  Takes Effexor  - Continue home meds    Anxiety  Assessment & Plan  Takes Effexor  - Continue home meds         MABLE Garner  7/10/2020     The patient was evaluated and managed by the physician assistant. I have personally seen and examined the patient.  I reviewed the note above and agree with the findings and plan of care, with an addendum documented below.    Pt seen and examined. Resting in bed. No chest pain/dyspnea    Heart: RRR  Lungs- cta b/l  abd - soft, +BS nt  Neuro - aao X3. No slurred speech  heent- sclera anicteric    Cont w pain management. rec PT/OT eval and treat. Further management per ortho surgery team. rec DVT proph per ortho rec. Trend hgb and monitor. Replete electrolytes.       Niraj Taveras. DO [place]

## 2020-07-10 NOTE — PROGRESS NOTES
Patient: Anne-Marie Quiroga  Location: [facility] 5PAV 5454  MRN: [id number]  Today's date: 7/10/2020  Patient treatment session complete. Patient positioned comfortably in bedside recliner with call bell in reach and alarm status on. Pt did not report or present with any chest pain, SOB or nausea, no neuro signs/symptoms. Attending RN aware of treatment session progress.    Anne-Marie is a 75 y.o. female admitted on 7/9/2020 with S/P total knee arthroplasty, left. Principal problem is Status post total left knee replacement.    Past Medical History  Anne-Marie has a past medical history of Anxiety, Arthritis, Basal cell carcinoma, Depression, History of nuclear stress test (03/06/2020), Hypertension, and Lipid disorder.    History of Present Illness   s/p L TKR    PT Pain    Date/Time Pain Type Pref Pain Scale Side Location Rating: Rest Rating: Activity [facility]   07/10/20 0952 Pain Assessment word (verbal rating pain scale) Left knee 3 2 - mild pain MTC          Prior Living Environment      Most Recent Value   Living Arrangements  apartment   Living Environment Comment  lives with spouse. 0 [place]          Prior Level of Function      Most Recent Value   Ambulation  independent   Transferring  independent   Toileting  independent   Bathing  independent   Dressing  independent   Eating  independent   Communication  understands/communicates without difficulty   Prior Level of Function Comment  pt reports IND w/o AD in ADLs/IADLs   Equipment Currently Used at Home  walker, front-wheeled, raised toilet          PT Evaluation and Treatment - 07/10/20 0952        Time Calculation    Start Time  0952     Stop Time  1056     Time Calculation (min)  64 min        Session Details    Document Type  daily treatment/progress note     Mode of Treatment  group therapy;physical therapy        General Information    Patient Profile Reviewed?  yes     General Observations of Patient  Pt rec'd at gym     Existing Precautions/Restrictions   fall;weight bearing        Weight-Bearing Status    Left LE Weight-Bearing Status  weight-bearing as tolerated (WBAT)        Cognition/Psychosocial    Affect/Mental Status (Cognitive)  WFL     Orientation Status (Cognition)  oriented x 3     Follows Commands (Cognition)  WFL     Cognitive Function (Cognitive)  WFL        Sensory    Hearing Status  WFL        Range of Motion (ROM)    Range of Motion  left lower extremity ROM deficit     Left Lower Extremity (ROM)  knee     Knee, Left (ROM)  AROM 8-87 PROM 0-95        Bed Mobility    Comment (Bed Mobility)  Pt rec'd OOB        Transfers    Transfers  car transfer     Maintains Weight-Bearing Status (Transfers)  able to maintain weight-bearing status        Sit to Stand Transfer    [place], Sit to Stand Transfer  modified independence     Assistive Device  walker, front-wheeled        Stand to Sit Transfer    [place], Stand to Sit Transfer  modified independence     Assistive Device  walker, front-wheeled        Car Transfer    Transfer Technique  stand-sit;sit-stand     [place], Car Transfer  supervision     Assistive Device  walker, front-wheeled     Comment  VC +TC's needed for proper technique w/ hand placement for transfers. Tendency to pull up from the walker.        Gait Training    [place], Gait  supervision     Assistive Device  walker, front-wheeled     Distance in Feet  80 feet     Gait Pattern Utilized  step-to     Deviations/Abnormal Patterns (Gait)  stride length decreased;step length decreased;antalgic;gait speed decreased     Maintains Weight-Bearing Status  able to maintain     Advanced Gait Activity  curb negotiation     Comment  Pt req'd cues for management of assistive device and cues for sequencing for BLE's. Vc's for heel strike and toe off.         Curb Negotiation (Mobility)    [place]  supervision     Comment  Vc’s needed for proper sequencing with progression of RW, body positioning within walker and step to technique.          Stairs Training    [place], Stairs  supervision     Assistive Device  railing     Handrail Location  both sides     Number of Stairs  4     Ascending Stairs Technique  step-to-step     Descending Stairs Technique  step-to-step     Maintains Weight-Bearing Status (Stairs)  able to maintain     Comment  VC +TC's needed for proper technique w/ hand placement        Balance    Static Sitting Balance  WFL     Dynamic Sitting Balance  WFL     Sit to Stand Dynamic Balance  mild impairment     Static Standing Balance  WFL     Dynamic Standing Balance  mild impairment     Comment, Balance  No LOB's        Therapeutic Exercise    Therapeutic Exercise  lower extremity        Lower Extremity (Therapeutic Exercise)    Exercise Position/Type (LE Therapeutic Exercise)  seated     General Exercise (LE Therapeutic Exercise)  quad sets;SAQ (short arc quad);gluteal sets;heel slides     Range of Motion Exercises (LE Therapeutic Exercise)  hip abduction/adduction;ankle dorsiflexion/plantarflexion     Reps and Sets (LE Therapeutic Exercise)  x10     Comment (LE Therapeutic Exercise)  Pt educated on TE's to perform to increase function ROM and strength of BLE's.        AM-PAC (TM) - Mobility (Current Function)    Turning from your back to your side while in a flat bed without using bedrails?  3 - A Little     Moving from lying on your back to sitting on the side of a flat bed without using bedrails?  3 - A Little     Moving to and from a bed to a chair?  4 - None     Standing up from a chair using your arms?  4 - None     To walk in a hospital room?  3 - A Little     Climbing 3-5 steps with a railing?  4 - None     AM-PAC (TM) Mobility Score  21        Therapy Assessment/Plan (PT)    Rehab Potential (PT)  good, to achieve stated therapy goals     Therapy Frequency (PT)  daily        Progress Summary (PT)    Daily Outcome Statement (PT)  Training conducted with negotiation of curb, car transfers, gait w/ RW and transfer  techniques. Pt. demo good carryover with instructions. Also educated in application of  surgical precautions with daily activities/mobility. Continue to recommend ongoing PT services to maximize (I) and confidence with all areas of functional mobility. Pt safe for d/c home w/OP services.     Symptoms Noted During/After Treatment  none        Therapy Plan Review/Discharge Plan (PT)    PT Recommended Discharge Disposition  home with outpatient services     Anticipated Equipment Needs at Discharge (PT Eval)  none        Plan of Care Review    Plan of Care Reviewed With  patient                       Education provided this session. See the Patient Education summary report for full details.    PT Goals      Most Recent Value   Bed Mobility Goal 1   Activity/Assistive Device  bed mobility activities, all at 07/09/2020 1425   [place]  independent at 07/09/2020 1425   Time Frame  by discharge at 07/09/2020 1425   Progress/Outcome  goal ongoing at 07/10/2020 0952   Transfer Goal 1   Activity/Assistive Device  all transfers at 07/09/2020 1425   [place]  independent at 07/09/2020 1425   Time Frame  by discharge at 07/09/2020 1425   Progress/Outcome  good progress toward goal at 07/10/2020 0952   Gait Training Goal 1   Activity/Assistive Device  gait (walking locomotion), walker, front-wheeled at 07/09/2020 1425   [place]  modified independence at 07/09/2020 1425   Distance  200 at 07/09/2020 1425   Time Frame  by discharge at 07/09/2020 1425   Progress/Outcome  good progress toward goal at 07/10/2020 0952   Stairs Goal 1   Activity/Assistive Device  stairs, all skills at 07/09/2020 1425   [place]  modified independence at 07/09/2020 1425   Number of Stairs  4 at 07/09/2020 1425   Time Frame  by discharge at 07/09/2020 1425   Strategies/Barriers  NOT REQUIRED FOR DC at 07/09/2020 1425   Progress/Outcome  good progress toward goal at 07/10/2020 0952

## 2020-07-13 NOTE — UM PHYSICIAN REVIEW NOTE
Utilization Secondary Review Note      Patient Name: Anne-Marie Quiroga    MRN: [id number]    Insurance:  MEDICARE  Date of Admission:  7/9/2020  Initial order:    Inpatient  Planned admission: Yes  Post Discharge Review: Yes            Outpatient services are appropriate for this 75 y.o. year old female with 1 midnight hospital stay s/p TKA.     Yodit River, DO  07/13/20

## 2021-01-20 ENCOUNTER — APPOINTMENT (OUTPATIENT)
Dept: URBAN - METROPOLITAN AREA CLINIC 200 | Age: 76
Setting detail: DERMATOLOGY
End: 2021-02-01

## 2021-01-20 DIAGNOSIS — L57.8 OTHER SKIN CHANGES DUE TO CHRONIC EXPOSURE TO NONIONIZING RADIATION: ICD-10-CM

## 2021-01-20 DIAGNOSIS — Z85.828 PERSONAL HISTORY OF OTHER MALIGNANT NEOPLASM OF SKIN: ICD-10-CM

## 2021-01-20 PROCEDURE — OTHER MIPS QUALITY: OTHER

## 2021-01-20 PROCEDURE — OTHER COUNSELING: OTHER

## 2021-01-20 PROCEDURE — 99212 OFFICE O/P EST SF 10 MIN: CPT

## 2021-01-20 ASSESSMENT — LOCATION SIMPLE DESCRIPTION DERM
LOCATION SIMPLE: CHEST
LOCATION SIMPLE: LEFT ANTERIOR NECK

## 2021-01-20 ASSESSMENT — LOCATION ZONE DERM
LOCATION ZONE: NECK
LOCATION ZONE: TRUNK

## 2021-01-20 ASSESSMENT — LOCATION DETAILED DESCRIPTION DERM
LOCATION DETAILED: LEFT INFERIOR ANTERIOR NECK
LOCATION DETAILED: LEFT MEDIAL SUPERIOR CHEST

## 2021-02-11 ENCOUNTER — HOSPITAL ENCOUNTER (OUTPATIENT)
Facility: HOSPITAL | Age: 76
Setting detail: SURGERY ADMIT
End: 2021-02-11
Attending: ORTHOPAEDIC SURGERY | Admitting: ORTHOPAEDIC SURGERY
Payer: MEDICARE

## 2022-03-18 ENCOUNTER — APPOINTMENT (OUTPATIENT)
Dept: URBAN - METROPOLITAN AREA CLINIC 200 | Age: 77
Setting detail: DERMATOLOGY
End: 2022-03-23

## 2022-03-18 DIAGNOSIS — Z11.52 ENCOUNTER FOR SCREENING FOR COVID-19: ICD-10-CM

## 2022-03-18 DIAGNOSIS — L57.8 OTHER SKIN CHANGES DUE TO CHRONIC EXPOSURE TO NONIONIZING RADIATION: ICD-10-CM

## 2022-03-18 DIAGNOSIS — L57.0 ACTINIC KERATOSIS: ICD-10-CM

## 2022-03-18 DIAGNOSIS — Z85.828 PERSONAL HISTORY OF OTHER MALIGNANT NEOPLASM OF SKIN: ICD-10-CM

## 2022-03-18 DIAGNOSIS — B35.1 TINEA UNGUIUM: ICD-10-CM

## 2022-03-18 PROBLEM — I10 ESSENTIAL (PRIMARY) HYPERTENSION: Status: ACTIVE | Noted: 2022-03-18

## 2022-03-18 PROCEDURE — OTHER MIPS QUALITY: OTHER

## 2022-03-18 PROCEDURE — OTHER LIQUID NITROGEN: OTHER

## 2022-03-18 PROCEDURE — 99213 OFFICE O/P EST LOW 20 MIN: CPT | Mod: 25

## 2022-03-18 PROCEDURE — OTHER COUNSELING: OTHER

## 2022-03-18 PROCEDURE — 17003 DESTRUCT PREMALG LES 2-14: CPT

## 2022-03-18 PROCEDURE — OTHER PRESCRIPTION MEDICATION MANAGEMENT: OTHER

## 2022-03-18 PROCEDURE — 17000 DESTRUCT PREMALG LESION: CPT

## 2022-03-18 PROCEDURE — OTHER SUNSCREEN RECOMMENDATIONS: OTHER

## 2022-03-18 PROCEDURE — OTHER PRESCRIPTION: OTHER

## 2022-03-18 PROCEDURE — OTHER SCREENING FOR COVID-19: OTHER

## 2022-03-18 RX ORDER — CICLOPIROX 80 MG/ML
SOLUTION TOPICAL
Qty: 6.6 | Refills: 5 | COMMUNITY
Start: 2022-03-18

## 2022-03-18 ASSESSMENT — LOCATION SIMPLE DESCRIPTION DERM
LOCATION SIMPLE: RIGHT GREAT TOE
LOCATION SIMPLE: RIGHT BREAST
LOCATION SIMPLE: CHEST
LOCATION SIMPLE: ABDOMEN
LOCATION SIMPLE: LEFT ANTERIOR NECK

## 2022-03-18 ASSESSMENT — LOCATION ZONE DERM
LOCATION ZONE: TOE
LOCATION ZONE: NECK
LOCATION ZONE: TRUNK

## 2022-03-18 ASSESSMENT — PAIN INTENSITY VAS: HOW INTENSE IS YOUR PAIN 0 BEING NO PAIN, 10 BEING THE MOST SEVERE PAIN POSSIBLE?: NO PAIN

## 2022-03-18 ASSESSMENT — LOCATION DETAILED DESCRIPTION DERM
LOCATION DETAILED: LEFT INFERIOR ANTERIOR NECK
LOCATION DETAILED: PERIUMBILICAL SKIN
LOCATION DETAILED: RIGHT MEDIAL SUPERIOR CHEST
LOCATION DETAILED: RIGHT DORSAL GREAT TOE
LOCATION DETAILED: LEFT CLAVICULAR NECK
LOCATION DETAILED: RIGHT MEDIAL BREAST 2-3:00 REGION

## 2022-03-18 NOTE — PROCEDURE: LIQUID NITROGEN
Detail Level: Detailed
Duration Of Freeze Thaw-Cycle (Seconds): 1
Number Of Freeze-Thaw Cycles: 1 freeze-thaw cycle
Consent: The patient's consent was obtained including but not limited to risks of crusting, scabbing, blistering, scarring, darker or lighter pigmentary change, recurrence, incomplete removal and infection.
Post-Care Instructions: I reviewed with the patient in detail post-care instructions. Patient is to wear sunprotection, and avoid picking at any of the treated lesions. Pt may apply Vaseline to crusted or scabbing areas.
Render Post-Care Instructions In Note?: no
Show Aperture Variable?: Yes

## 2023-05-04 ENCOUNTER — APPOINTMENT (OUTPATIENT)
Dept: URBAN - METROPOLITAN AREA CLINIC 203 | Age: 78
Setting detail: DERMATOLOGY
End: 2023-05-07

## 2023-05-04 DIAGNOSIS — L82.0 INFLAMED SEBORRHEIC KERATOSIS: ICD-10-CM

## 2023-05-04 DIAGNOSIS — Z85.828 PERSONAL HISTORY OF OTHER MALIGNANT NEOPLASM OF SKIN: ICD-10-CM

## 2023-05-04 DIAGNOSIS — L57.8 OTHER SKIN CHANGES DUE TO CHRONIC EXPOSURE TO NONIONIZING RADIATION: ICD-10-CM

## 2023-05-04 PROCEDURE — 99213 OFFICE O/P EST LOW 20 MIN: CPT | Mod: 25

## 2023-05-04 PROCEDURE — 17110 DESTRUCT B9 LESION 1-14: CPT

## 2023-05-04 PROCEDURE — OTHER COUNSELING: OTHER

## 2023-05-04 PROCEDURE — OTHER SUNSCREEN RECOMMENDATIONS: OTHER

## 2023-05-04 PROCEDURE — OTHER LIQUID NITROGEN: OTHER

## 2023-05-04 ASSESSMENT — LOCATION DETAILED DESCRIPTION DERM
LOCATION DETAILED: LEFT MEDIAL BREAST 11-12:00 REGION
LOCATION DETAILED: LEFT MEDIAL BREAST 10-11:00 REGION
LOCATION DETAILED: EPIGASTRIC SKIN
LOCATION DETAILED: RIGHT INFERIOR LATERAL NECK
LOCATION DETAILED: LEFT CENTRAL LATERAL NECK

## 2023-05-04 ASSESSMENT — LOCATION SIMPLE DESCRIPTION DERM
LOCATION SIMPLE: NECK
LOCATION SIMPLE: ABDOMEN
LOCATION SIMPLE: LEFT BREAST
LOCATION SIMPLE: RIGHT ANTERIOR NECK

## 2023-05-04 ASSESSMENT — LOCATION ZONE DERM
LOCATION ZONE: NECK
LOCATION ZONE: TRUNK

## 2023-05-04 ASSESSMENT — PAIN INTENSITY VAS: HOW INTENSE IS YOUR PAIN 0 BEING NO PAIN, 10 BEING THE MOST SEVERE PAIN POSSIBLE?: NO PAIN

## 2023-05-04 NOTE — PROCEDURE: LIQUID NITROGEN
Spray Paint Text: The liquid nitrogen was applied to the skin utilizing a spray paint frosting technique.
Medical Necessity Clause: This procedure was medically necessary because the lesions that were treated were:
Render Post-Care Instructions In Note?: no
Medical Necessity Information: It is in your best interest to select a reason for this procedure from the list below. All of these items fulfill various CMS LCD requirements except the new and changing color options.
Show Applicator Variable?: Yes
Post-Care Instructions: I reviewed with the patient in detail post-care instructions. Patient is to wear sunprotection, and avoid picking at any of the treated lesions. Pt may apply Vaseline to crusted or scabbing areas.
Consent: The patient's consent was obtained including but not limited to risks of crusting, scabbing, blistering, scarring, darker or lighter pigmentary change, recurrence, incomplete removal and infection.
Detail Level: Detailed

## 2024-01-24 NOTE — PLAN OF CARE
Problem: Adult Inpatient Plan of Care  Goal: Plan of Care Review  Outcome: Progressing  Flowsheets (Taken 7/10/2020 0500)  Progress: improving  Plan of Care Reviewed With: patient  Outcome Summary: Pt OOB x1 assist with walker, Pain well controlled with ATC tylenol, toradol and ice.  Voiding urine without issue, denies numbness and tingling, dressing c/d/i.   For PT/OT today in the gym at 9:30am.  For possilbe discharge home with spouse today if cleared.     
  Problem: Adult Inpatient Plan of Care  Goal: Plan of Care Review  Outcome: Progressing  Flowsheets (Taken 7/10/2020 1116)  Progress: improving  Plan of Care Reviewed With: patient  Outcome Summary: Training conducted with negotiation of curb, car transfers, gait w/ RW and transfer techniques. Pt. demo good carryover with instructions. Also educated in application of  surgical precautions with daily activities/mobility. Continue to recommend ongoing PT services to maximize (I) and confidence with all areas of functional mobility. Pt safe for d/c home w/OP services.     
  Problem: Adult Inpatient Plan of Care  Goal: Plan of Care Review  Outcome: Progressing  Flowsheets (Taken 7/10/2020 1120)  Progress: improving  Plan of Care Reviewed With: patient  Outcome Summary: Pt completed OT group session, demonstrated good safety awareness for functional transfers to toilet, shower and bed. Educated on adaptive ADL strategies and appropriate DME. clear for home from OT services at this time     
  Problem: Adult Inpatient Plan of Care  Goal: Plan of Care Review  Outcome: Progressing  Flowsheets (Taken 7/9/2020 4690)  Progress: improving  Plan of Care Reviewed With: patient  Outcome Summary: pt/ot jayant completed ambulated in the hallway. due to void @1940. ETCo2 monitoring in place.     
  Problem: Adult Inpatient Plan of Care  Goal: Plan of Care Review  Outcome: Progressing  Flowsheets (Taken 7/9/2020 6882)  Progress: improving  Plan of Care Reviewed With: patient  Outcome Summary: PT jayant completed     
  Problem: Adult Inpatient Plan of Care  Goal: Plan of Care Review  Outcome: Progressing  Flowsheets (Taken 7/9/2020 8314)  Progress: improving  Plan of Care Reviewed With: patient  Outcome Summary: OT eval complete. [facility] 21. Pt req SUP for bed mobility and transfers w/ RW. Req VC for hand placement for transfers. Mod A for LBD. Anticipate safe d/c home w/ assist from  as needed. OT will continue to follow     
  Problem: Adult Inpatient Plan of Care  Goal: Readiness for Transition of Care  Intervention: Mutually Develop Transition Plan  Flowsheets (Taken 7/9/2020 1523)  Anticipated Discharge Disposition: home without services  Equipment Needed After Discharge: none  Equipment Currently Used at Home: walker, front-wheeled; raised toilet  Anticipated Changes Related to Illness: none  Readmission Within the Last 30 Days: no previous admission in last 30 days  Patient/Family Anticipated Services at Transition: none  Patient/Family Anticipates Transition to: home with family  Transportation Anticipated: family or friend will provide  Concerns to be Addressed: no discharge needs identified    Met with patient at bedside. Stated she lives in an apartment with her spouse. She has elevator access. PCP and Pharmacy were confirmed. She was independent PTA and has 2 bathrooms on one floor apartment. She has a walker and raised toilet seats. She stated she has no financial difficulty getting her meds. Her spouse will transport her home. She is planning o/P rehab at [facility] where she will get 1:1 therapy or at [facility]     
Would like to have Home PT. Hopes to go home today.  
Detail Level: Zone
Detail Level: Detailed
Detail Level: Generalized

## (undated) DEVICE — BANDAGE ESMARK 6X12 LATEX FREE

## (undated) DEVICE — BLANKET UPPER BODY BAIR HUGGER

## (undated) DEVICE — APPLICATOR CHLORAPREP 26ML ORANGE TINT

## (undated) DEVICE — NOZZLE 90 DEGREE TIBIAL PRESSURIZER

## (undated) DEVICE — DRESSING MEPILEX 4X10 BORDER

## (undated) DEVICE — VEST STERILE

## (undated) DEVICE — DRAPE-U NON-STERILE

## (undated) DEVICE — BLADE SAGITTAL #127 STABLECUT

## (undated) DEVICE — STOCKINETTE 8IN STERILE MEDC

## (undated) DEVICE — DRAPE EXTREMITY UNIVERSAL

## (undated) DEVICE — SOLN IRRIG STER WATER 1000ML

## (undated) DEVICE — HOOD FLYTE SURGICOOL

## (undated) DEVICE — SOLN IV 0.9% NSS 1000ML

## (undated) DEVICE — GLOVE SURG PROTEXIS PF 7.5

## (undated) DEVICE — SUTURE BONE WAX   W31G

## (undated) DEVICE — SYRINGE RED DISP LUER LOK 20CC

## (undated) DEVICE — ADHESIVE SKIN DERMABOND ADVANCED 0.7ML

## (undated) DEVICE — SUTURE STRATAFIX PDO 1 OS-8 CUTTING 36CM

## (undated) DEVICE — SET HANDPIECE INTERPULSE

## (undated) DEVICE — PAD GROUND ELECTROSURGICAL W/CORD

## (undated) DEVICE — BLADE SCALPEL #10

## (undated) DEVICE — DRAPE SPLIT U IMPERVIOUS 89331

## (undated) DEVICE — TUBING SMOKE EVAC PENCIL COATED

## (undated) DEVICE — SUTURE VICRYL PLUS 2-0 VCP869H

## (undated) DEVICE — SUTURE VICRYL 1 J699H OS-8 27IN UNDYED

## (undated) DEVICE — NEEDLE DISP HYPO 21GX1 1/2IN

## (undated) DEVICE — Device

## (undated) DEVICE — GLOVE SURG PROTEXIS PF 8

## (undated) DEVICE — ***USE 57698*** SLEEVE FLOWTRON DVT CALF SINGLE USE

## (undated) DEVICE — MANIFOLD FOUR PORT NEPTUNE

## (undated) DEVICE — COVER LIGHTHANDLE

## (undated) DEVICE — GLOVE SZ 8 LINER PROTEXIS PI BL

## (undated) DEVICE — SUTURE MONOCRYL 3-0  Y497G

## (undated) DEVICE — SUCTION 18FR FRAZIER DISPOSABLE

## (undated) DEVICE — KIT CEMENT MIXING CARTRIDGE AND NOZZLE

## (undated) DEVICE — CUFF TOURNIQUET DISP 34 X 4